# Patient Record
Sex: FEMALE | Race: WHITE | NOT HISPANIC OR LATINO
[De-identification: names, ages, dates, MRNs, and addresses within clinical notes are randomized per-mention and may not be internally consistent; named-entity substitution may affect disease eponyms.]

---

## 2017-02-16 ENCOUNTER — APPOINTMENT (OUTPATIENT)
Dept: ENDOCRINOLOGY | Facility: CLINIC | Age: 67
End: 2017-02-16

## 2017-02-16 VITALS
SYSTOLIC BLOOD PRESSURE: 126 MMHG | HEART RATE: 77 BPM | DIASTOLIC BLOOD PRESSURE: 84 MMHG | WEIGHT: 264 LBS | HEIGHT: 63.5 IN | BODY MASS INDEX: 46.2 KG/M2 | OXYGEN SATURATION: 97 %

## 2017-03-06 LAB
CORTIS SAL-MCNC: NORMAL
CORTIS SAL-MCNC: NORMAL

## 2017-04-11 ENCOUNTER — TRANSCRIPTION ENCOUNTER (OUTPATIENT)
Age: 67
End: 2017-04-11

## 2017-04-21 ENCOUNTER — APPOINTMENT (OUTPATIENT)
Dept: ENDOCRINOLOGY | Facility: CLINIC | Age: 67
End: 2017-04-21

## 2017-04-26 ENCOUNTER — RX RENEWAL (OUTPATIENT)
Age: 67
End: 2017-04-26

## 2017-05-05 ENCOUNTER — APPOINTMENT (OUTPATIENT)
Dept: ORTHOPEDIC SURGERY | Facility: CLINIC | Age: 67
End: 2017-05-05

## 2017-05-05 VITALS
HEART RATE: 86 BPM | HEIGHT: 63.5 IN | SYSTOLIC BLOOD PRESSURE: 156 MMHG | BODY MASS INDEX: 46.2 KG/M2 | DIASTOLIC BLOOD PRESSURE: 88 MMHG | WEIGHT: 264 LBS

## 2017-05-05 RX ORDER — EPINEPHRINE 0.3 MG/.3ML
0.3 INJECTION INTRAMUSCULAR
Qty: 2 | Refills: 0 | Status: ACTIVE | COMMUNITY
Start: 2015-09-03

## 2017-05-05 RX ORDER — HYDROXYZINE PAMOATE 50 MG/1
50 CAPSULE ORAL
Qty: 500 | Refills: 0 | Status: ACTIVE | COMMUNITY
Start: 2015-10-20

## 2017-05-08 ENCOUNTER — FORM ENCOUNTER (OUTPATIENT)
Age: 67
End: 2017-05-08

## 2017-05-09 ENCOUNTER — APPOINTMENT (OUTPATIENT)
Dept: MRI IMAGING | Facility: CLINIC | Age: 67
End: 2017-05-09

## 2017-05-09 ENCOUNTER — OUTPATIENT (OUTPATIENT)
Dept: OUTPATIENT SERVICES | Facility: HOSPITAL | Age: 67
LOS: 1 days | End: 2017-05-09
Payer: MEDICARE

## 2017-05-09 DIAGNOSIS — M17.11 UNILATERAL PRIMARY OSTEOARTHRITIS, RIGHT KNEE: ICD-10-CM

## 2017-05-09 PROCEDURE — 73721 MRI JNT OF LWR EXTRE W/O DYE: CPT

## 2017-05-31 ENCOUNTER — APPOINTMENT (OUTPATIENT)
Dept: ORTHOPEDIC SURGERY | Facility: CLINIC | Age: 67
End: 2017-05-31

## 2017-06-08 ENCOUNTER — APPOINTMENT (OUTPATIENT)
Dept: GASTROENTEROLOGY | Facility: CLINIC | Age: 67
End: 2017-06-08

## 2017-07-27 ENCOUNTER — APPOINTMENT (OUTPATIENT)
Dept: GASTROENTEROLOGY | Facility: CLINIC | Age: 67
End: 2017-07-27

## 2017-08-02 ENCOUNTER — APPOINTMENT (OUTPATIENT)
Dept: ORTHOPEDIC SURGERY | Facility: CLINIC | Age: 67
End: 2017-08-02
Payer: MEDICARE

## 2017-08-02 VITALS
HEART RATE: 97 BPM | BODY MASS INDEX: 46.2 KG/M2 | DIASTOLIC BLOOD PRESSURE: 85 MMHG | SYSTOLIC BLOOD PRESSURE: 138 MMHG | HEIGHT: 63.5 IN | WEIGHT: 264 LBS

## 2017-08-02 PROCEDURE — 99214 OFFICE O/P EST MOD 30 MIN: CPT | Mod: 25

## 2017-08-02 PROCEDURE — 20610 DRAIN/INJ JOINT/BURSA W/O US: CPT | Mod: RT

## 2017-09-08 ENCOUNTER — OUTPATIENT (OUTPATIENT)
Dept: OUTPATIENT SERVICES | Facility: HOSPITAL | Age: 67
LOS: 1 days | Discharge: HOME | End: 2017-09-08
Payer: MEDICARE

## 2017-09-08 ENCOUNTER — RESULT REVIEW (OUTPATIENT)
Age: 67
End: 2017-09-08

## 2017-09-08 ENCOUNTER — APPOINTMENT (OUTPATIENT)
Dept: GASTROENTEROLOGY | Facility: HOSPITAL | Age: 67
End: 2017-09-08

## 2017-09-08 DIAGNOSIS — K21.0 GASTRO-ESOPHAGEAL REFLUX DISEASE WITH ESOPHAGITIS: ICD-10-CM

## 2017-09-08 PROCEDURE — 88305 TISSUE EXAM BY PATHOLOGIST: CPT | Mod: 26

## 2017-09-08 PROCEDURE — 45380 COLONOSCOPY AND BIOPSY: CPT

## 2017-09-25 ENCOUNTER — APPOINTMENT (OUTPATIENT)
Dept: GASTROENTEROLOGY | Facility: CLINIC | Age: 67
End: 2017-09-25
Payer: MEDICARE

## 2017-09-25 PROCEDURE — 99214 OFFICE O/P EST MOD 30 MIN: CPT

## 2017-09-25 PROCEDURE — 99204 OFFICE O/P NEW MOD 45 MIN: CPT

## 2017-09-26 ENCOUNTER — APPOINTMENT (OUTPATIENT)
Dept: NEUROLOGY | Facility: CLINIC | Age: 67
End: 2017-09-26

## 2017-10-02 ENCOUNTER — OUTPATIENT (OUTPATIENT)
Dept: OUTPATIENT SERVICES | Facility: HOSPITAL | Age: 67
LOS: 1 days | End: 2017-10-02
Payer: MEDICARE

## 2017-10-02 DIAGNOSIS — R13.10 DYSPHAGIA, UNSPECIFIED: ICD-10-CM

## 2017-10-02 PROCEDURE — 74220 X-RAY XM ESOPHAGUS 1CNTRST: CPT | Mod: 26

## 2017-10-02 PROCEDURE — 74220 X-RAY XM ESOPHAGUS 1CNTRST: CPT

## 2017-10-10 ENCOUNTER — APPOINTMENT (OUTPATIENT)
Dept: ORTHOPEDIC SURGERY | Facility: CLINIC | Age: 67
End: 2017-10-10
Payer: MEDICARE

## 2017-10-10 VITALS
SYSTOLIC BLOOD PRESSURE: 161 MMHG | WEIGHT: 264 LBS | HEART RATE: 86 BPM | BODY MASS INDEX: 46.2 KG/M2 | DIASTOLIC BLOOD PRESSURE: 95 MMHG | HEIGHT: 63.5 IN

## 2017-10-10 DIAGNOSIS — Z96.651 PRESENCE OF RIGHT ARTIFICIAL KNEE JOINT: ICD-10-CM

## 2017-10-10 PROCEDURE — 20610 DRAIN/INJ JOINT/BURSA W/O US: CPT | Mod: RT

## 2017-10-10 PROCEDURE — 99214 OFFICE O/P EST MOD 30 MIN: CPT | Mod: 25

## 2017-10-10 PROCEDURE — 73562 X-RAY EXAM OF KNEE 3: CPT | Mod: 50

## 2017-10-12 ENCOUNTER — OTHER (OUTPATIENT)
Age: 67
End: 2017-10-12

## 2017-11-08 ENCOUNTER — APPOINTMENT (OUTPATIENT)
Dept: ORTHOPEDIC SURGERY | Facility: CLINIC | Age: 67
End: 2017-11-08
Payer: MEDICARE

## 2017-11-08 VITALS
BODY MASS INDEX: 46.2 KG/M2 | DIASTOLIC BLOOD PRESSURE: 95 MMHG | WEIGHT: 264 LBS | HEART RATE: 85 BPM | SYSTOLIC BLOOD PRESSURE: 145 MMHG | HEIGHT: 63.5 IN

## 2017-11-08 DIAGNOSIS — Z96.652 PRESENCE OF LEFT ARTIFICIAL KNEE JOINT: ICD-10-CM

## 2017-11-08 PROCEDURE — 99214 OFFICE O/P EST MOD 30 MIN: CPT

## 2017-11-13 ENCOUNTER — APPOINTMENT (OUTPATIENT)
Dept: DERMATOLOGY | Facility: CLINIC | Age: 67
End: 2017-11-13
Payer: MEDICARE

## 2017-11-13 PROCEDURE — 99214 OFFICE O/P EST MOD 30 MIN: CPT

## 2017-12-26 ENCOUNTER — APPOINTMENT (OUTPATIENT)
Dept: ORTHOPEDIC SURGERY | Facility: CLINIC | Age: 67
End: 2017-12-26
Payer: MEDICARE

## 2017-12-26 VITALS
WEIGHT: 264 LBS | BODY MASS INDEX: 46.2 KG/M2 | HEART RATE: 83 BPM | DIASTOLIC BLOOD PRESSURE: 109 MMHG | SYSTOLIC BLOOD PRESSURE: 164 MMHG | HEIGHT: 63.5 IN

## 2017-12-26 PROCEDURE — 20610 DRAIN/INJ JOINT/BURSA W/O US: CPT | Mod: RT

## 2018-01-16 ENCOUNTER — APPOINTMENT (OUTPATIENT)
Dept: ORTHOPEDIC SURGERY | Facility: HOSPITAL | Age: 68
End: 2018-01-16

## 2018-02-09 ENCOUNTER — APPOINTMENT (OUTPATIENT)
Dept: ORTHOPEDIC SURGERY | Facility: CLINIC | Age: 68
End: 2018-02-09

## 2018-03-06 ENCOUNTER — APPOINTMENT (OUTPATIENT)
Dept: ORTHOPEDIC SURGERY | Facility: CLINIC | Age: 68
End: 2018-03-06

## 2018-04-11 ENCOUNTER — APPOINTMENT (OUTPATIENT)
Dept: ORTHOPEDIC SURGERY | Facility: CLINIC | Age: 68
End: 2018-04-11

## 2018-04-30 ENCOUNTER — APPOINTMENT (OUTPATIENT)
Dept: ORTHOPEDIC SURGERY | Facility: CLINIC | Age: 68
End: 2018-04-30

## 2018-05-21 ENCOUNTER — APPOINTMENT (OUTPATIENT)
Dept: DERMATOLOGY | Facility: CLINIC | Age: 68
End: 2018-05-21
Payer: MEDICARE

## 2018-05-21 PROCEDURE — 99213 OFFICE O/P EST LOW 20 MIN: CPT

## 2018-06-01 ENCOUNTER — APPOINTMENT (OUTPATIENT)
Dept: ORTHOPEDIC SURGERY | Facility: CLINIC | Age: 68
End: 2018-06-01
Payer: MEDICARE

## 2018-06-01 VITALS
DIASTOLIC BLOOD PRESSURE: 87 MMHG | HEIGHT: 63.5 IN | HEART RATE: 80 BPM | BODY MASS INDEX: 46.2 KG/M2 | WEIGHT: 264 LBS | SYSTOLIC BLOOD PRESSURE: 134 MMHG

## 2018-06-01 PROCEDURE — 99214 OFFICE O/P EST MOD 30 MIN: CPT

## 2018-06-05 ENCOUNTER — OUTPATIENT (OUTPATIENT)
Dept: OUTPATIENT SERVICES | Facility: HOSPITAL | Age: 68
LOS: 1 days | End: 2018-06-05
Payer: MEDICARE

## 2018-06-05 VITALS
SYSTOLIC BLOOD PRESSURE: 147 MMHG | WEIGHT: 265 LBS | HEART RATE: 85 BPM | DIASTOLIC BLOOD PRESSURE: 98 MMHG | TEMPERATURE: 97 F | RESPIRATION RATE: 16 BRPM | HEIGHT: 63 IN

## 2018-06-05 DIAGNOSIS — M17.11 UNILATERAL PRIMARY OSTEOARTHRITIS, RIGHT KNEE: ICD-10-CM

## 2018-06-05 DIAGNOSIS — Z96.652 PRESENCE OF LEFT ARTIFICIAL KNEE JOINT: Chronic | ICD-10-CM

## 2018-06-05 DIAGNOSIS — O03.9 COMPLETE OR UNSPECIFIED SPONTANEOUS ABORTION WITHOUT COMPLICATION: Chronic | ICD-10-CM

## 2018-06-05 DIAGNOSIS — Z01.818 ENCOUNTER FOR OTHER PREPROCEDURAL EXAMINATION: ICD-10-CM

## 2018-06-05 DIAGNOSIS — I10 ESSENTIAL (PRIMARY) HYPERTENSION: ICD-10-CM

## 2018-06-05 DIAGNOSIS — M79.7 FIBROMYALGIA: ICD-10-CM

## 2018-06-05 DIAGNOSIS — Z29.9 ENCOUNTER FOR PROPHYLACTIC MEASURES, UNSPECIFIED: ICD-10-CM

## 2018-06-05 LAB
APTT BLD: 30 SEC — SIGNIFICANT CHANGE UP (ref 27.5–37.4)
BLD GP AB SCN SERPL QL: SIGNIFICANT CHANGE UP
INR BLD: 0.99 RATIO — SIGNIFICANT CHANGE UP (ref 0.88–1.16)
MRSA PCR RESULT.: DETECTED
PROTHROM AB SERPL-ACNC: 10.9 SEC — SIGNIFICANT CHANGE UP (ref 9.8–12.7)
S AUREUS DNA NOSE QL NAA+PROBE: DETECTED
TYPE + AB SCN PNL BLD: SIGNIFICANT CHANGE UP

## 2018-06-05 PROCEDURE — 86900 BLOOD TYPING SEROLOGIC ABO: CPT

## 2018-06-05 PROCEDURE — 86850 RBC ANTIBODY SCREEN: CPT

## 2018-06-05 PROCEDURE — 36415 COLL VENOUS BLD VENIPUNCTURE: CPT

## 2018-06-05 PROCEDURE — G0463: CPT

## 2018-06-05 PROCEDURE — 85610 PROTHROMBIN TIME: CPT

## 2018-06-05 PROCEDURE — 87640 STAPH A DNA AMP PROBE: CPT

## 2018-06-05 PROCEDURE — 93005 ELECTROCARDIOGRAM TRACING: CPT

## 2018-06-05 PROCEDURE — 87641 MR-STAPH DNA AMP PROBE: CPT

## 2018-06-05 PROCEDURE — 93010 ELECTROCARDIOGRAM REPORT: CPT

## 2018-06-05 PROCEDURE — 85730 THROMBOPLASTIN TIME PARTIAL: CPT

## 2018-06-05 PROCEDURE — 86901 BLOOD TYPING SEROLOGIC RH(D): CPT

## 2018-06-05 RX ORDER — SODIUM CHLORIDE 9 MG/ML
3 INJECTION INTRAMUSCULAR; INTRAVENOUS; SUBCUTANEOUS EVERY 8 HOURS
Qty: 0 | Refills: 0 | Status: DISCONTINUED | OUTPATIENT
Start: 2018-06-19 | End: 2018-06-19

## 2018-06-05 RX ORDER — MUPIROCIN 20 MG/G
1 OINTMENT TOPICAL
Qty: 1 | Refills: 0 | OUTPATIENT
Start: 2018-06-05 | End: 2018-06-09

## 2018-06-05 NOTE — H&P PST ADULT - LAB RESULTS AND INTERPRETATION
6/5/2018  Pt and Ortho PA made aware of postive MRSA/MSSA.  Rx e-prescribed and pt made aware. (FARHAN SANTA)

## 2018-06-05 NOTE — PATIENT PROFILE ADULT. - LEARNING ASSESSMENT (PATIENT) ADDITIONAL COMMENTS
Instructed pt on pre-op instructions, tips for safer surgery, pain management scale, pre-surgical infection prevention instructions, MRSA/MSSA instructions, Medical Clearance with Dr. Perry, Cardiac clearance with Dr Saba, Joint Replacement Book(Knee) given to pt as per Elissa Lundberg, take Levothyroxine with a sip of water the morning of surgery and verbalized understanding of all.

## 2018-06-05 NOTE — PATIENT PROFILE ADULT. - PRO MENTAL HEALTH SX RECENT
feeling down/feeling hopelessness/little interest or pleasure in doing things/difficulty concentrating/feeling depressed

## 2018-06-05 NOTE — H&P PST ADULT - ASSESSMENT
CAPRINI SCORE [CLOT]    AGE RELATED RISK FACTORS                                                       MOBILITY RELATED FACTORS  [ ] Age 41-60 years                                            (1 Point)                  [ ] Bed rest                                                        (1 Point)  [x] Age: 61-74 years                                           (2 Points)                 [ ] Plaster cast                                                   (2 Points)  [ ] Age= 75 years                                              (3 Points)                 [ ] Bed bound for more than 72 hours                 (2 Points)    DISEASE RELATED RISK FACTORS                                               GENDER SPECIFIC FACTORS  [ ] Edema in the lower extremities                       (1 Point)                  [ ] Pregnancy                                                     (1 Point)  [ ] Varicose veins                                               (1 Point)                  [ ] Post-partum < 6 weeks                                   (1 Point)             [x ] BMI > 25 Kg/m2                                            (1 Point)                  [x ] Hormonal therapy  or oral contraception          (1 Point)                 [ ] Sepsis (in the previous month)                        (1 Point)                  [ ] History of pregnancy complications                 (1 point)  [ ] Pneumonia or serious lung disease                                               [ ] Unexplained or recurrent                     (1 Point)           (in the previous month)                               (1 Point)  [ ] Abnormal pulmonary function test                     (1 Point)                 SURGERY RELATED RISK FACTORS  [ ] Acute myocardial infarction                              (1 Point)                 [ ]  Section                                             (1 Point)  [ ] Congestive heart failure (in the previous month)  (1 Point)               [ ] Minor surgery                                                  (1 Point)   [ ] Inflammatory bowel disease                             (1 Point)                 [ ] Arthroscopic surgery                                        (2 Points)  [ ] Central venous access                                      (2 Points)                [ ] General surgery lasting more than 45 minutes   (2 Points)       [ ] Stroke (in the previous month)                          (5 Points)               [x ] Elective arthroplasty                                         (5 Points)                                                                                                                                               HEMATOLOGY RELATED FACTORS                                                 TRAUMA RELATED RISK FACTORS  [ ] Prior episodes of VTE                                     (3 Points)                 [ ] Fracture of the hip, pelvis, or leg                       (5 Points)  [ ] Positive family history for VTE                         (3 Points)                 [ ] Acute spinal cord injury (in the previous month)  (5 Points)  [ ] Prothrombin 38758 A                                     (3 Points)                 [ ] Paralysis  (less than 1 month)                             (5 Points)  [ ] Factor V Leiden                                             (3 Points)                  [ ] Multiple Trauma within 1 month                        (5 Points)  [ ] Lupus anticoagulants                                     (3 Points)                                                           [ ] Anticardiolipin antibodies                               (3 Points)                                                       [ ] High homocysteine in the blood                      (3 Points)                                             [ ] Other congenital or acquired thrombophilia      (3 Points)                                                [ ] Heparin induced thrombocytopenia                  (3 Points)                                          Total Score [   9       ]

## 2018-06-05 NOTE — PATIENT PROFILE ADULT. - FAMILY HISTORY
Mother  Still living? No  Family history of throat cancer, Age at diagnosis: Age Unknown     Father  Still living? No  Family history of acute myocardial infarction, Age at diagnosis: 41-50  Family history of hypertension, Age at diagnosis: Age Unknown  Family history of diabetes mellitus, Age at diagnosis: Age Unknown     Sibling  Still living? Unknown  Family history of coronary thrombosis, Age at diagnosis: 71-80

## 2018-06-05 NOTE — PATIENT PROFILE ADULT. - PSH
H/O ovarian cystectomy    H/O: hysterectomy  LOUISE, BSO    History of total left knee replacement    Obesity    S/P appendectomy    S/P cholecystectomy    S/P colon resection  12  S/P D&C    S/P lumpectomy of breast  right breast -benign  12  Trauma  s/p multiple repair of uterine trauma secondary to an accident as a child with a broom

## 2018-06-05 NOTE — H&P PST ADULT - RS GEN PE MLT RESP DETAILS PC
diminished breath sounds, L/respirations non-labored/diminished breath sounds, R/airway patent/normal

## 2018-06-05 NOTE — PATIENT PROFILE ADULT. - PMH
Alcoholism    Anxiety    Asthma  well controlled  Chronic urticaria    Diverticulitis    Esophagus disorder  Dysperistalsis of lower half of esophagus.  Fibromyalgia    GERD (gastroesophageal reflux disease)    H/O allergy  environmental, animal  HTN (hypertension)    Hypothyroidism  h/o Hashimoto Thyroiditis  IBS (irritable bowel syndrome)    Lactose intolerance    Lupus  discoid type  Meniscus tear  left knee  MVP (mitral valve prolapse)    Osteoarthritis    Prediabetes    Risk factors for obstructive sleep apnea    Sjoegren syndrome    UTI (urinary tract infection)  frequent, last treated on 12/10/13 X 10 days with Bactrim, last cultured on 12/31 which was negative

## 2018-06-05 NOTE — H&P PST ADULT - HISTORY OF PRESENT ILLNESS
This is a 67 y.o female who presents to [facility] today.  The pt reports a two year history of right knee pain for which she has undergone injections for in the past.  She is now scheduled for a right knee replacement in the near future.

## 2018-06-05 NOTE — H&P PST ADULT - NSANTHOSAYNRD_GEN_A_CORE
No. ZAKIYA screening performed.  STOP BANG Legend: 0-2 = LOW Risk; 3-4 = INTERMEDIATE Risk; 5-8 = HIGH Risk

## 2018-06-05 NOTE — H&P PST ADULT - PMH
Anxiety    Asthma  well controlled  Chronic urticaria    Diverticulitis    Fibromyalgia    GERD (gastroesophageal reflux disease)    H/O allergy  environmental, animal  HTN (hypertension)    Hypothyroidism  h/o Hashimoto Thyroiditis  IBS (irritable bowel syndrome)    Lactose intolerance    Lupus  discoid type  Meniscus tear  left knee  MVP (mitral valve prolapse)    Osteoarthritis    Prediabetes    Sjoegren syndrome    UTI (urinary tract infection)  frequent, last treated on 12/10/13 X 10 days with Bactrim, last cultured on 12/31 which was negative Alcoholism    Anxiety    Asthma  well controlled  Chronic urticaria    Diverticulitis    Esophagus disorder  Dysperistalsis of lower half of esophagus.  Fibromyalgia    GERD (gastroesophageal reflux disease)    H/O allergy  environmental, animal  HTN (hypertension)    Hypothyroidism  h/o Hashimoto Thyroiditis  IBS (irritable bowel syndrome)    Lactose intolerance    Lupus  discoid type  Meniscus tear  left knee  MVP (mitral valve prolapse)    Osteoarthritis    Prediabetes    Risk factors for obstructive sleep apnea    Sjoegren syndrome    UTI (urinary tract infection)  frequent, last treated on 12/10/13 X 10 days with Bactrim, last cultured on 12/31 which was negative

## 2018-06-18 RX ORDER — TRANEXAMIC ACID 100 MG/ML
1200 INJECTION, SOLUTION INTRAVENOUS ONCE
Qty: 0 | Refills: 0 | Status: DISCONTINUED | OUTPATIENT
Start: 2018-06-19 | End: 2018-06-19

## 2018-06-18 RX ORDER — OXYCODONE HYDROCHLORIDE 5 MG/1
10 TABLET ORAL ONCE
Qty: 0 | Refills: 0 | Status: DISCONTINUED | OUTPATIENT
Start: 2018-06-19 | End: 2018-06-19

## 2018-06-18 RX ORDER — GABAPENTIN 400 MG/1
600 CAPSULE ORAL ONCE
Qty: 0 | Refills: 0 | Status: COMPLETED | OUTPATIENT
Start: 2018-06-19 | End: 2018-06-19

## 2018-06-18 RX ORDER — CELECOXIB 200 MG/1
400 CAPSULE ORAL ONCE
Qty: 0 | Refills: 0 | Status: COMPLETED | OUTPATIENT
Start: 2018-06-19 | End: 2018-06-19

## 2018-06-18 RX ORDER — ACETAMINOPHEN 500 MG
1000 TABLET ORAL ONCE
Qty: 0 | Refills: 0 | Status: DISCONTINUED | OUTPATIENT
Start: 2018-06-19 | End: 2018-06-19

## 2018-06-18 RX ORDER — VANCOMYCIN HCL 1 G
1500 VIAL (EA) INTRAVENOUS ONCE
Qty: 0 | Refills: 0 | Status: COMPLETED | OUTPATIENT
Start: 2018-06-19 | End: 2018-06-19

## 2018-06-19 ENCOUNTER — RESULT REVIEW (OUTPATIENT)
Age: 68
End: 2018-06-19

## 2018-06-19 ENCOUNTER — APPOINTMENT (OUTPATIENT)
Dept: ORTHOPEDIC SURGERY | Facility: HOSPITAL | Age: 68
End: 2018-06-19

## 2018-06-19 ENCOUNTER — TRANSCRIPTION ENCOUNTER (OUTPATIENT)
Age: 68
End: 2018-06-19

## 2018-06-19 ENCOUNTER — INPATIENT (INPATIENT)
Facility: HOSPITAL | Age: 68
LOS: 2 days | Discharge: HOME | DRG: 470 | End: 2018-06-22
Attending: ORTHOPAEDIC SURGERY | Admitting: ORTHOPAEDIC SURGERY
Payer: MEDICARE

## 2018-06-19 VITALS
WEIGHT: 264.55 LBS | RESPIRATION RATE: 16 BRPM | TEMPERATURE: 97 F | HEART RATE: 78 BPM | OXYGEN SATURATION: 96 % | SYSTOLIC BLOOD PRESSURE: 111 MMHG | HEIGHT: 63 IN | DIASTOLIC BLOOD PRESSURE: 63 MMHG

## 2018-06-19 DIAGNOSIS — J45.909 UNSPECIFIED ASTHMA, UNCOMPLICATED: ICD-10-CM

## 2018-06-19 DIAGNOSIS — E03.9 HYPOTHYROIDISM, UNSPECIFIED: ICD-10-CM

## 2018-06-19 DIAGNOSIS — I10 ESSENTIAL (PRIMARY) HYPERTENSION: ICD-10-CM

## 2018-06-19 DIAGNOSIS — M17.11 UNILATERAL PRIMARY OSTEOARTHRITIS, RIGHT KNEE: ICD-10-CM

## 2018-06-19 DIAGNOSIS — Z96.652 PRESENCE OF LEFT ARTIFICIAL KNEE JOINT: Chronic | ICD-10-CM

## 2018-06-19 DIAGNOSIS — Z29.9 ENCOUNTER FOR PROPHYLACTIC MEASURES, UNSPECIFIED: ICD-10-CM

## 2018-06-19 DIAGNOSIS — R73.03 PREDIABETES: ICD-10-CM

## 2018-06-19 DIAGNOSIS — O03.9 COMPLETE OR UNSPECIFIED SPONTANEOUS ABORTION WITHOUT COMPLICATION: Chronic | ICD-10-CM

## 2018-06-19 DIAGNOSIS — F10.20 ALCOHOL DEPENDENCE, UNCOMPLICATED: ICD-10-CM

## 2018-06-19 DIAGNOSIS — K21.9 GASTRO-ESOPHAGEAL REFLUX DISEASE WITHOUT ESOPHAGITIS: ICD-10-CM

## 2018-06-19 DIAGNOSIS — F41.9 ANXIETY DISORDER, UNSPECIFIED: ICD-10-CM

## 2018-06-19 LAB
BLD GP AB SCN SERPL QL: SIGNIFICANT CHANGE UP
GLUCOSE BLDC GLUCOMTR-MCNC: 103 MG/DL — HIGH (ref 70–99)
GLUCOSE BLDC GLUCOMTR-MCNC: 112 MG/DL — HIGH (ref 70–99)
GLUCOSE BLDC GLUCOMTR-MCNC: 115 MG/DL — HIGH (ref 70–99)
TYPE + AB SCN PNL BLD: SIGNIFICANT CHANGE UP

## 2018-06-19 PROCEDURE — 88311 DECALCIFY TISSUE: CPT | Mod: 26

## 2018-06-19 PROCEDURE — 20985 CPTR-ASST DIR MS PX: CPT | Mod: AS,59

## 2018-06-19 PROCEDURE — 27447 TOTAL KNEE ARTHROPLASTY: CPT | Mod: RT

## 2018-06-19 PROCEDURE — 27447 TOTAL KNEE ARTHROPLASTY: CPT | Mod: AS,RT

## 2018-06-19 PROCEDURE — 73560 X-RAY EXAM OF KNEE 1 OR 2: CPT | Mod: 26,RT

## 2018-06-19 PROCEDURE — 88305 TISSUE EXAM BY PATHOLOGIST: CPT | Mod: 26

## 2018-06-19 PROCEDURE — 99222 1ST HOSP IP/OBS MODERATE 55: CPT

## 2018-06-19 PROCEDURE — 20985 CPTR-ASST DIR MS PX: CPT | Mod: 59

## 2018-06-19 RX ORDER — SODIUM CHLORIDE 9 MG/ML
1000 INJECTION, SOLUTION INTRAVENOUS
Qty: 0 | Refills: 0 | Status: DISCONTINUED | OUTPATIENT
Start: 2018-06-19 | End: 2018-06-22

## 2018-06-19 RX ORDER — ONDANSETRON 8 MG/1
4 TABLET, FILM COATED ORAL EVERY 6 HOURS
Qty: 0 | Refills: 0 | Status: DISCONTINUED | OUTPATIENT
Start: 2018-06-20 | End: 2018-06-22

## 2018-06-19 RX ORDER — FOLIC ACID 0.8 MG
1 TABLET ORAL DAILY
Qty: 0 | Refills: 0 | Status: DISCONTINUED | OUTPATIENT
Start: 2018-06-20 | End: 2018-06-22

## 2018-06-19 RX ORDER — ALBUTEROL 90 UG/1
2 AEROSOL, METERED ORAL EVERY 6 HOURS
Qty: 0 | Refills: 0 | Status: DISCONTINUED | OUTPATIENT
Start: 2018-06-20 | End: 2018-06-22

## 2018-06-19 RX ORDER — MAGNESIUM HYDROXIDE 400 MG/1
30 TABLET, CHEWABLE ORAL DAILY
Qty: 0 | Refills: 0 | Status: DISCONTINUED | OUTPATIENT
Start: 2018-06-20 | End: 2018-06-22

## 2018-06-19 RX ORDER — LISINOPRIL 2.5 MG/1
40 TABLET ORAL DAILY
Qty: 0 | Refills: 0 | Status: DISCONTINUED | OUTPATIENT
Start: 2018-06-21 | End: 2018-06-22

## 2018-06-19 RX ORDER — VANCOMYCIN HCL 1 G
1500 VIAL (EA) INTRAVENOUS
Qty: 0 | Refills: 0 | Status: COMPLETED | OUTPATIENT
Start: 2018-06-19 | End: 2018-06-19

## 2018-06-19 RX ORDER — HYDROMORPHONE HYDROCHLORIDE 2 MG/ML
0.5 INJECTION INTRAMUSCULAR; INTRAVENOUS; SUBCUTANEOUS EVERY 4 HOURS
Qty: 0 | Refills: 0 | Status: DISCONTINUED | OUTPATIENT
Start: 2018-06-20 | End: 2018-06-20

## 2018-06-19 RX ORDER — DOCUSATE SODIUM 100 MG
100 CAPSULE ORAL THREE TIMES A DAY
Qty: 0 | Refills: 0 | Status: DISCONTINUED | OUTPATIENT
Start: 2018-06-20 | End: 2018-06-22

## 2018-06-19 RX ORDER — LEVOTHYROXINE SODIUM 125 MCG
150 TABLET ORAL DAILY
Qty: 0 | Refills: 0 | Status: DISCONTINUED | OUTPATIENT
Start: 2018-06-19 | End: 2018-06-22

## 2018-06-19 RX ORDER — OXYCODONE HYDROCHLORIDE 5 MG/1
5 TABLET ORAL EVERY 4 HOURS
Qty: 0 | Refills: 0 | Status: DISCONTINUED | OUTPATIENT
Start: 2018-06-19 | End: 2018-06-19

## 2018-06-19 RX ORDER — MORPHINE SULFATE 50 MG/1
4 CAPSULE, EXTENDED RELEASE ORAL
Qty: 0 | Refills: 0 | Status: DISCONTINUED | OUTPATIENT
Start: 2018-06-19 | End: 2018-06-19

## 2018-06-19 RX ORDER — LEVOTHYROXINE SODIUM 125 MCG
137 TABLET ORAL DAILY
Qty: 0 | Refills: 0 | Status: DISCONTINUED | OUTPATIENT
Start: 2018-06-19 | End: 2018-06-22

## 2018-06-19 RX ORDER — OXYCODONE HYDROCHLORIDE 5 MG/1
10 TABLET ORAL
Qty: 0 | Refills: 0 | Status: DISCONTINUED | OUTPATIENT
Start: 2018-06-19 | End: 2018-06-20

## 2018-06-19 RX ORDER — OXYCODONE HYDROCHLORIDE 5 MG/1
10 TABLET ORAL EVERY 4 HOURS
Qty: 0 | Refills: 0 | Status: DISCONTINUED | OUTPATIENT
Start: 2018-06-20 | End: 2018-06-20

## 2018-06-19 RX ORDER — THIAMINE MONONITRATE (VIT B1) 100 MG
100 TABLET ORAL DAILY
Qty: 0 | Refills: 0 | Status: COMPLETED | OUTPATIENT
Start: 2018-06-19 | End: 2018-06-22

## 2018-06-19 RX ORDER — SODIUM CHLORIDE 9 MG/ML
1000 INJECTION, SOLUTION INTRAVENOUS
Qty: 0 | Refills: 0 | Status: DISCONTINUED | OUTPATIENT
Start: 2018-06-19 | End: 2018-06-19

## 2018-06-19 RX ORDER — ACETAMINOPHEN 500 MG
975 TABLET ORAL EVERY 8 HOURS
Qty: 0 | Refills: 0 | Status: DISCONTINUED | OUTPATIENT
Start: 2018-06-19 | End: 2018-06-22

## 2018-06-19 RX ORDER — AMLODIPINE BESYLATE 2.5 MG/1
5 TABLET ORAL DAILY
Qty: 0 | Refills: 0 | Status: DISCONTINUED | OUTPATIENT
Start: 2018-06-19 | End: 2018-06-22

## 2018-06-19 RX ORDER — OXYCODONE HYDROCHLORIDE 5 MG/1
5 TABLET ORAL
Qty: 0 | Refills: 0 | Status: DISCONTINUED | OUTPATIENT
Start: 2018-06-19 | End: 2018-06-20

## 2018-06-19 RX ORDER — ENOXAPARIN SODIUM 100 MG/ML
30 INJECTION SUBCUTANEOUS EVERY 12 HOURS
Qty: 0 | Refills: 0 | Status: DISCONTINUED | OUTPATIENT
Start: 2018-06-20 | End: 2018-06-22

## 2018-06-19 RX ORDER — ONDANSETRON 8 MG/1
4 TABLET, FILM COATED ORAL ONCE
Qty: 0 | Refills: 0 | Status: DISCONTINUED | OUTPATIENT
Start: 2018-06-19 | End: 2018-06-19

## 2018-06-19 RX ORDER — KETOROLAC TROMETHAMINE 30 MG/ML
15 SYRINGE (ML) INJECTION ONCE
Qty: 0 | Refills: 0 | Status: DISCONTINUED | OUTPATIENT
Start: 2018-06-20 | End: 2018-06-20

## 2018-06-19 RX ORDER — FOLIC ACID 0.8 MG
1 TABLET ORAL DAILY
Qty: 0 | Refills: 0 | Status: DISCONTINUED | OUTPATIENT
Start: 2018-06-19 | End: 2018-06-21

## 2018-06-19 RX ORDER — POTASSIUM CHLORIDE 20 MEQ
10 PACKET (EA) ORAL DAILY
Qty: 0 | Refills: 0 | Status: DISCONTINUED | OUTPATIENT
Start: 2018-06-20 | End: 2018-06-22

## 2018-06-19 RX ORDER — CLONAZEPAM 1 MG
1 TABLET ORAL THREE TIMES A DAY
Qty: 0 | Refills: 0 | Status: DISCONTINUED | OUTPATIENT
Start: 2018-06-19 | End: 2018-06-21

## 2018-06-19 RX ORDER — PANTOPRAZOLE SODIUM 20 MG/1
40 TABLET, DELAYED RELEASE ORAL
Qty: 0 | Refills: 0 | Status: DISCONTINUED | OUTPATIENT
Start: 2018-06-20 | End: 2018-06-22

## 2018-06-19 RX ORDER — MORPHINE SULFATE 50 MG/1
2 CAPSULE, EXTENDED RELEASE ORAL EVERY 4 HOURS
Qty: 0 | Refills: 0 | Status: DISCONTINUED | OUTPATIENT
Start: 2018-06-19 | End: 2018-06-20

## 2018-06-19 RX ORDER — ACETAMINOPHEN 500 MG
650 TABLET ORAL EVERY 6 HOURS
Qty: 0 | Refills: 0 | Status: DISCONTINUED | OUTPATIENT
Start: 2018-06-20 | End: 2018-06-21

## 2018-06-19 RX ORDER — HYDROXYZINE HCL 10 MG
50 TABLET ORAL THREE TIMES A DAY
Qty: 0 | Refills: 0 | Status: DISCONTINUED | OUTPATIENT
Start: 2018-06-20 | End: 2018-06-22

## 2018-06-19 RX ADMIN — MORPHINE SULFATE 4 MILLIGRAM(S): 50 CAPSULE, EXTENDED RELEASE ORAL at 17:00

## 2018-06-19 RX ADMIN — Medication 1 MILLIGRAM(S): at 23:34

## 2018-06-19 RX ADMIN — Medication 100 MILLIGRAM(S): at 16:29

## 2018-06-19 RX ADMIN — Medication 300 MILLIGRAM(S): at 19:26

## 2018-06-19 RX ADMIN — CELECOXIB 400 MILLIGRAM(S): 200 CAPSULE ORAL at 07:10

## 2018-06-19 RX ADMIN — GABAPENTIN 600 MILLIGRAM(S): 400 CAPSULE ORAL at 07:11

## 2018-06-19 RX ADMIN — OXYCODONE HYDROCHLORIDE 10 MILLIGRAM(S): 5 TABLET ORAL at 07:11

## 2018-06-19 RX ADMIN — MORPHINE SULFATE 4 MILLIGRAM(S): 50 CAPSULE, EXTENDED RELEASE ORAL at 16:59

## 2018-06-19 RX ADMIN — OXYCODONE HYDROCHLORIDE 5 MILLIGRAM(S): 5 TABLET ORAL at 16:13

## 2018-06-19 RX ADMIN — Medication 300 MILLIGRAM(S): at 07:15

## 2018-06-19 RX ADMIN — Medication 100 MILLIGRAM(S): at 23:33

## 2018-06-19 NOTE — CONSULT NOTE ADULT - PROBLEM SELECTOR RECOMMENDATION 7
Patient state she was counseled to quit  , last drink was 2 wks ago , was treated with Librium for Alcohol withdrawal prophylaxis - will closely observe . Patient state she was counseled to quit  , last drink was 2 wks ago , was treated with Librium for Alcohol withdrawal prophylaxis - will closely observe . Will have patient on [facility] protocol .

## 2018-06-19 NOTE — CONSULT NOTE ADULT - SUBJECTIVE AND OBJECTIVE BOX
PMD : Mando  Cardio : Juan    Patient is a 67y old  Female who is s/p R TKA , POD # 0 , seen on PACU    CC: R knee pain   HPI:  This is a 67 y.o female with  two year history of right knee pain for which she has undergone injections for in the past( cortisone and gel ) , was taking Aleve with some relief . Pain got worst lately .  She is now s/p R TKA , POD # 0      PAST MEDICAL & SURGICAL HISTORY:  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  Risk factors for obstructive sleep apnea  Alcoholism - last drink 2 wks ago  Meniscus tear: left knee  Osteoarthritis  MVP (mitral valve prolapse)  Lactose intolerance  Fibromyalgia  Sjoegren syndrome  Lupus: discoid type  Chronic urticaria  Diverticulitis  Anxiety  H/O allergy: environmental, animal, food  GERD (gastroesophageal reflux disease)  IBS (irritable bowel syndrome)  Hypothyroidism: h/o Hashimoto Thyroiditis  HTN (hypertension)  Prediabetes  Asthma: well controlled    History of total left knee replacement  S/P lumpectomy of breast: right breast -benign  12  S/P colon resection: 13  Trauma: s/p multiple repair of uterine trauma secondary to an accident as a child with a broom  S/P D&C  Obesity  S/P cholecystectomy:   H/O: hysterectomy: LOUISEKARL RAMOS    S/P appendectomy:   H/O ovarian cystectomy      Social History:  Tobacco - denies  ETOH - use to drink liquor 5-6 drinks a day , state quit drinking 2 wks ago    Illicit drug abuse - denies    Retired     FAMILY HISTORY:  Family history of diabetes mellitus (Father)  Family history of hypertension (Father)  Family history of coronary thrombosis (Sibling)  Family history of acute myocardial infarction (Father)  Family history of throat cancer (Mother)      Allergies    banana, coconut, pineapple, mustard, vinegar, sulfite (Other)  Benadryl (Hives)  beta blockers (Anaphylaxis)  Ceclor (Hives)  Cipro (Hives)  dairy products (Unknown)  fentanyl (Hives)  Flagyl (Hives)  hydrocodone (Other)  hydromorphone (Other)  Keflex (Hives)  lactose (Unknown)  penicillin (Anaphylaxis)  Tree Nuts (Unknown)    Intolerances        HOME MEDICATIONS :     · 	Multiple Vitamins oral tablet: Last Dose Taken:  , 1 tab(s) orally once a day  · 	folic acid 1 mg oral tablet: Last Dose Taken:  , 1 tab(s) orally once a day  · 	Zestoretic 12.5 mg-20 mg oral tablet: Last Dose Taken:  , 1 tab(s) orally once a day  · 	conjugated estrogens 0.3 mg oral tablet: Last Dose Taken:  , 1 tab(s) orally once a day  · 	ocular lubricant ophthalmic solution: Last Dose Taken:  ,  to each affected eye   · 	clonazePAM 1 mg oral tablet: Last Dose Taken:  , 1 tab(s) orally 3 times a day  · 	melatonin 3 mg oral tablet: Last Dose Taken:  , 1 tab(s) orally once a day (at bedtime), As needed, Insomnia  · 	hydrOXYzine hydrochloride 50 mg oral tablet: Last Dose Taken:  , 1 tab(s) orally 3 times a day  · 	levothyroxine 137 mcg (0.137 mg) oral tablet: Last Dose Taken:  , orally every other day  · 	levothyroxine 150 mcg (0.15 mg) oral tablet: Last Dose Taken:  , orally every other day  · 	lisinopril 40 mg oral tablet: Last Dose Taken:  , orally 2 times a day  · 	potassium chloride 10 mEq oral tablet, extended release: Last Dose Taken:    · 	Norvasc 5 mg oral tablet: Last Dose Taken:  , 1 tab(s) orally 2 times a day  · 	pantoprazole 40 mg oral delayed release tablet: Last Dose Taken:  , 1 tab(s) orally once a day  · 	vitamin d: 2000 milligram(s)  once a day  · 	Xopenex HFA 45 mcg/inh inhalation aerosol: 2 puff(s) inhaled every 4 hours, As Needed  · 	albuterol: Last Dose Taken:    · 	EpiPen Auto-Injector 0.3 mg injectable kit: Last Dose Taken:    · 	Librium 25 mg oral capsule: 3 tab(s) orally once a day - finished 1 wk ago  (Due to alcohol abuse)    REVIEW OF SYSTEMS:    CONSTITUTIONAL: No fever, weight loss, or fatigue  EYES: No eye pain, visual disturbances, or discharge  NECK: No pain or stiffness  RESPIRATORY: No cough, wheezing, chills or hemoptysis; No shortness of breath  CARDIOVASCULAR: No chest pain, palpitations, dizziness, or leg swelling  GASTROINTESTINAL: No abdominal or epigastric pain. No nausea, vomiting, or hematemesis; No diarrhea or constipation. No melena or hematochezia.  GENITOURINARY: No dysuria, frequency, hematuria, or incontinence  NEUROLOGICAL: No headaches, memory loss, loss of strength, numbness, or tremors  SKIN: No itching, burning, rashes, or lesions   LYMPH NODES: No enlarged glands  ENDOCRINE: No heat or cold intolerance; No hair loss  MUSCULOSKELETAL: R knee pain   PSYCHIATRIC: No depression, anxiety, mood swings, or difficulty sleeping  HEME/LYMPH: No easy bruising, or bleeding gums  ALLERGY AND IMMUNOLOGIC: No hives or eczema    MEDICATIONS  (STANDING):  lactated ringers. 1000 milliLiter(s) (100 mL/Hr) IV Continuous <Continuous>    MEDICATIONS  (PRN):  morphine  - Injectable 4 milliGRAM(s) IV Push every 10 minutes PRN Moderate Pain (4 - 6)  ondansetron Injectable 4 milliGRAM(s) IV Push once PRN Nausea and/or Vomiting      Vital Signs Last 24 Hrs  T(C): 37 (2018 11:31), Max: 37 (2018 11:31)  T(F): 98.6 (2018 11:31), Max: 98.6 (2018 11:31)  HR: 88 (2018 11:40) (78 - 89)  BP: 90/48 (2018 11:40) (84/53 - 111/63)  BP(mean): --  RR: 10 (2018 11:40) (9 - 16)  SpO2: 96% (2018 11:40) (96% - 96%)    PHYSICAL EXAM:    GENERAL: NAD, well-groomed, well-developed  HEAD:  Atraumatic, Normocephalic  EYES: EOMI, PERRLA, conjunctiva and sclera clear  NECK: Supple, No JVD, Normal thyroid  NERVOUS SYSTEM:  Alert & Oriented X3, Good concentration; Motor Strength 5/5 B/L upper and lower extremities; DTRs 2+ intact and symmetric  CHEST/LUNG: CTA  b/l,  no rales, rhonchi, wheezing, or rubs  HEART: Regular rate and rhythm; No murmurs, rubs, or gallops  ABDOMEN: Soft, Nontender, Nondistended; Bowel sounds present  EXTREMITIES:  2+ Peripheral Pulses, No clubbing, cyanosis, or edema , R knee ACE WRAP + , clean and dry   SKIN: No rashes or lesions    LABS: Pending       RADIOLOGY & ADDITIONAL STUDIES:

## 2018-06-19 NOTE — PROGRESS NOTE ADULT - SUBJECTIVE AND OBJECTIVE BOX
Ortho Post Op Check    Name: BEAU LOVE    MR #: [id number]    Procedure: right total knee arthroplasty   Surgeon: Nett    Pt comfortable without complaints, pain controlled  Denies CP, SOB, N/V, numbness/tingling     General Exam:  Vital Signs Last 24 Hrs  T(C): 36 (06-19-18 @ 18:54), Max: 36 (06-19-18 @ 18:54)  T(F): 96.8 (06-19-18 @ 18:54), Max: 96.8 (06-19-18 @ 18:54)  HR: 63 (06-19-18 @ 18:54) (54 - 63)  BP: 102/53 (06-19-18 @ 18:54) (85/55 - 107/41)  BP(mean): --  RR: 16 (06-19-18 @ 18:54) (12 - 16)  SpO2: 100% (06-19-18 @ 18:54) (100% - 100%)    General: Pt Alert and oriented, NAD, controlled pain.  Dressings C/D/I. No bleeding.  Pulses: 2+ dorsalis pedis pulse. Cap refill < 2 sec.  Sensation: Grossly intact to light touch without deficit.  Motor: + EHL/FHL/TA/GS    Post-op X-Ray:    < from: Xray Knee 1 or 2 Views, Right (06.19.18 @ 11:45) >     EXAM:  KNEE-RIGHT                          PROCEDURE DATE:  06/19/2018          INTERPRETATION:  HISTORY: Postoperative  knee replacement.    Two views of the right knee are submitted.    Evaluation demonstrates the presence of a tricompartmental knee   replacement with the femoral, tibial and patellar components in proper   anatomic alignment. There is no fracture .       Impression:  Knee prosthetic components in proper anatomical alignment.        < end of copied text >      A/P: 67yFemale POD#0 s/p right total knee arthroplasty   - Stable  - Pain Control  - DVT ppx: Lovenox  - Post op abx: Ancef, Vanco  - PT eval pending  - Weight bearing status: WBAT  - Straight cath 480 ml in recovery at 1850 - monitor void status Ortho Post Op Check    Name: BEAU LOVE    MR #: [id number]    Procedure: right total knee arthroplasty   Surgeon: Nett    Pt comfortable without complaints, pain controlled  Denies CP, SOB, N/V, numbness/tingling     General Exam:  Vital Signs Last 24 Hrs  T(C): 36 (06-19-18 @ 18:54), Max: 36 (06-19-18 @ 18:54)  T(F): 96.8 (06-19-18 @ 18:54), Max: 96.8 (06-19-18 @ 18:54)  HR: 63 (06-19-18 @ 18:54) (54 - 63)  BP: 102/53 (06-19-18 @ 18:54) (85/55 - 107/41)  BP(mean): --  RR: 16 (06-19-18 @ 18:54) (12 - 16)  SpO2: 100% (06-19-18 @ 18:54) (100% - 100%)    General: Pt Alert and oriented, NAD, controlled pain.  Dressings C/D/I. No bleeding.  Pulses: 2+ dorsalis pedis pulse. Cap refill < 2 sec.  Sensation: Grossly intact to light touch without deficit.  Motor: + EHL/FHL/TA/GS    Post-op X-Ray:    < from: Xray Knee 1 or 2 Views, Right (06.19.18 @ 11:45) >     EXAM:  KNEE-RIGHT                          PROCEDURE DATE:  06/19/2018          INTERPRETATION:  HISTORY: Postoperative  knee replacement.    Two views of the right knee are submitted.    Evaluation demonstrates the presence of a tricompartmental knee   replacement with the femoral, tibial and patellar components in proper   anatomic alignment. There is no fracture .       Impression:  Knee prosthetic components in proper anatomical alignment.        < end of copied text >      A/P: 67yFemale POD#0 s/p right total knee arthroplasty   - Stable  - Pain Control  - DVT ppx: Lovenox  - Post op abx: Clinda, Vanco  - PT eval pending  - Weight bearing status: WBAT  - Straight cath 480 ml in recovery at 1850 - monitor void status

## 2018-06-19 NOTE — BRIEF OPERATIVE NOTE - PROCEDURE
<<-----Click on this checkbox to enter Procedure TKA (total knee arthroplasty)  06/19/2018    Active  MNETT

## 2018-06-19 NOTE — DISCHARGE NOTE ADULT - MEDICATION SUMMARY - MEDICATIONS TO STOP TAKING
I will STOP taking the medications listed below when I get home from the hospital:    melatonin 3 mg oral tablet  -- 1 tab(s) by mouth once a day (at bedtime), As needed, Insomnia

## 2018-06-19 NOTE — DISCHARGE NOTE ADULT - ADDITIONAL INSTRUCTIONS
The patient will be seen in the office in 3 weeks for wound check. Sutures/Staples/Tape will be removed at that time. Patient may shower after post-op day #3. The dressing is to be removed on 6/26/18. IF THE DRESSING BECOMES SOILED BEFORE THE REMOVAL DATE, CHANGE WITH A SIMILAR DRESSING. IF THE DRESSING BECOMES STAINED WITH DISCHARGE, CONTACT THE OFFICE FOR FURTHER DIRECTIONS. The patient will contact the office if the wound becomes red, has increasing pain, develops bleeding or discharge, an injury occurs, or has other concerns. The patient will continue PT consistent with total knee replacement. The patient will continue LOVENOX for 2 weeks and then begin 325mg TWICE daily for 4 weeks for blood clot prevention. The patient will take OXYCODONE AND TYLENOL for pain control and titrate according to prescription and patient needs. The patient will take Colace while taking oxycodone to prevent narcotic associated constipation.  Additionally, increase water intake (drink at least 8 glasses of water daily) and try adding fiber to the diet by eating fruits, vegetables and foods that are rich in grains. If constipation is experienced, contact the medical/primary care provider to discuss further treatment options. The patient is FULL weight bearing. Elevation of the lower leg is recommended to reduce swelling. The patient will be seen in the office in 3 weeks for wound check. Tape will be removed at that time. Patient may shower after post-op day #5. The dressing is to be removed on 6/29/18. IF THE DRESSING BECOMES SOILED BEFORE THE REMOVAL DATE, CHANGE WITH A SIMILAR DRESSING. IF THE DRESSING BECOMES STAINED WITH DISCHARGE, CONTACT THE OFFICE FOR FURTHER DIRECTIONS. The patient will contact the office if the wound becomes red, has increasing pain, develops bleeding or discharge, an injury occurs, or has other concerns. The patient will continue PT consistent with total knee replacement. The patient will continue LOVENOX for 2 weeks and then begin 325mg TWICE daily for 4 weeks for blood clot prevention. The patient will take OXYCODONE AND TYLENOL for pain control and titrate according to prescription and patient needs. The patient will take Senna S while taking oxycodone to prevent narcotic associated constipation.  Additionally, increase water intake (drink at least 8 glasses of water daily) and try adding fiber to the diet by eating fruits, vegetables and foods that are rich in grains. If constipation is experienced, contact the medical/primary care provider to discuss further treatment options. The patient is FULL weight bearing. Elevation of the lower leg is recommended to reduce swelling.

## 2018-06-19 NOTE — DISCHARGE NOTE ADULT - MEDICATION SUMMARY - MEDICATIONS TO TAKE
I will START or STAY ON the medications listed below when I get home from the hospital:    vitamin d  -- 2000 milligram(s)  once a day  -- Indication: For Home med    oxyCODONE 10 mg oral tablet  -- 1 tab(s) by mouth every 4 to 6 hours, As Needed -Mild Pain (1 - 3) MDD:6  -- Indication: For Prn pain - Do NOT take concomitantly with klonopin     ibuprofen 400 mg oral tablet  -- 1 tab(s) by mouth every 4 hours, As needed, mild pain  -- Indication: For Prn pain    Aspirin Enteric Coated 325 mg oral delayed release tablet  -- 1 tab(s) by mouth 2 times a day   -- Swallow whole.  Do not crush.  Take with food or milk.    -- Indication: For DVTP post lovenox therapy    lisinopril 40 mg oral tablet  -- orally 2 times a day  -- Indication: For Home med    enoxaparin 30 mg/0.3 mL injectable solution  -- 30 milligram(s) injectable 2 times a day  -- Indication: For DVTP    clonazePAM 1 mg oral tablet  -- 0.5 milligram(s) by mouth 3 times a day while on narcotics, as per Dr Crowell  -- Indication: For Home med- decrease dose by half  while taking narcotics    hydrOXYzine hydrochloride 50 mg oral tablet  -- 1 tab(s) by mouth 3 times a day  -- Indication: For Home med    Librium 25 mg oral capsule  -- 3 tab(s) by mouth once a day for the next six days.  (Due to alcohol abuse)  -- Indication: For Home med    Xopenex HFA 45 mcg/inh inhalation aerosol  -- 2 puff(s) inhaled every 4 hours, As Needed  -- Indication: For Home med    albuterol  -- Indication: For Home med    Norvasc 5 mg oral tablet  -- 1 tab(s) by mouth 2 times a day  -- Indication: For Home med    EpiPen Auto-Injector 0.3 mg injectable kit  -- Indication: For Home med    Senna S 50 mg-8.6 mg oral tablet  -- 2 tab(s) by mouth once a day (at bedtime)   -- Medication should be taken with plenty of water.    -- Indication: For constipation prevention    potassium chloride 10 mEq oral tablet, extended release  -- Indication: For Home med    ocular lubricant ophthalmic solution  --  to each affected eye   -- Indication: For Home med    pantoprazole 40 mg oral delayed release tablet  -- 1 tab(s) by mouth once a day  -- Indication: For Home med    conjugated estrogens 0.3 mg oral tablet  -- 1 tab(s) by mouth once a day  -- Indication: For Home med    levothyroxine 137 mcg (0.137 mg) oral tablet  -- orally every other day  -- Indication: For Home med    levothyroxine 150 mcg (0.15 mg) oral tablet  -- orally every other day  -- Indication: For Home med    Multiple Vitamins oral tablet  -- 1 tab(s) by mouth once a day  -- Indication: For Home med    folic acid 1 mg oral tablet  -- 1 tab(s) by mouth once a day  -- Indication: For Home med

## 2018-06-19 NOTE — DISCHARGE NOTE ADULT - CARE PLAN
Principal Discharge DX:	Osteoarthritis  Goal:	Resume ADLs  Assessment and plan of treatment:	physical therapy, pain control

## 2018-06-19 NOTE — CONSULT NOTE ADULT - PROBLEM SELECTOR RECOMMENDATION 9
S/P R TKA , POD # 0  PT/OT/pain mgmt  DVT prophylaxis- as per ortho  Abx as per SCIP  Incentive spirometry  Prophylaxis of opioid  induced constipation stable , continue Albuterol PRN

## 2018-06-19 NOTE — CONSULT NOTE ADULT - PROBLEM SELECTOR RECOMMENDATION 2
continue home meds with parameters continue home meds with parameters. Presently patient with postop hypotension - will continue ivf .

## 2018-06-19 NOTE — DISCHARGE NOTE ADULT - PATIENT PORTAL LINK FT
You can access the [facility] Portal, offered by [facility], by registering with the following website: [email or web address]

## 2018-06-19 NOTE — DISCHARGE NOTE ADULT - HOSPITAL COURSE
The patient underwent a RIGHT TOTAL KNEE REPLACEMENT on 6/19/18. The patient received antibiotics consistent with SCIP guidelines. The patient underwent the procedure and had no intra-operative complications. Post-operatively, the patient was seen by medicine and PT. The patient received LOVENOX for DVTP. The patient received pain medications per orthopedic pain managment protocol and the pain was appropriately controlled. The patient did not have any post-operative medical complications. The patient was discharged in stable condition.

## 2018-06-19 NOTE — CONSULT NOTE ADULT - ASSESSMENT
This is a 67 y.o female with  two year history of right knee pain for which she has undergone injections for in the past( cortisone and gel ) , was taking Aleve with some relief . Pain got worst lately .  She is now s/p R TKA , POD # 0

## 2018-06-19 NOTE — DISCHARGE NOTE ADULT - MEDICATION SUMMARY - MEDICATIONS TO CHANGE
I will SWITCH the dose or number of times a day I take the medications listed below when I get home from the hospital:    clonazePAM 1 mg oral tablet  -- 1 tab(s) by mouth 3 times a day

## 2018-06-20 LAB
ANION GAP SERPL CALC-SCNC: 11 MMOL/L — SIGNIFICANT CHANGE UP (ref 5–17)
BUN SERPL-MCNC: 7 MG/DL — LOW (ref 8–20)
CALCIUM SERPL-MCNC: 8.6 MG/DL — SIGNIFICANT CHANGE UP (ref 8.6–10.2)
CHLORIDE SERPL-SCNC: 98 MMOL/L — SIGNIFICANT CHANGE UP (ref 98–107)
CO2 SERPL-SCNC: 25 MMOL/L — SIGNIFICANT CHANGE UP (ref 22–29)
CREAT SERPL-MCNC: 0.8 MG/DL — SIGNIFICANT CHANGE UP (ref 0.5–1.3)
GLUCOSE SERPL-MCNC: 125 MG/DL — HIGH (ref 70–115)
HCT VFR BLD CALC: 34.7 % — LOW (ref 37–47)
HGB BLD-MCNC: 11.3 G/DL — LOW (ref 12–16)
MAGNESIUM SERPL-MCNC: 1.5 MG/DL — LOW (ref 1.6–2.6)
MCHC RBC-ENTMCNC: 29.4 PG — SIGNIFICANT CHANGE UP (ref 27–31)
MCHC RBC-ENTMCNC: 32.6 G/DL — SIGNIFICANT CHANGE UP (ref 32–36)
MCV RBC AUTO: 90.4 FL — SIGNIFICANT CHANGE UP (ref 81–99)
PHOSPHATE SERPL-MCNC: 3 MG/DL — SIGNIFICANT CHANGE UP (ref 2.4–4.7)
PLATELET # BLD AUTO: 270 K/UL — SIGNIFICANT CHANGE UP (ref 150–400)
POTASSIUM SERPL-MCNC: 4.3 MMOL/L — SIGNIFICANT CHANGE UP (ref 3.5–5.3)
POTASSIUM SERPL-SCNC: 4.3 MMOL/L — SIGNIFICANT CHANGE UP (ref 3.5–5.3)
RBC # BLD: 3.84 M/UL — LOW (ref 4.4–5.2)
RBC # FLD: 15.8 % — HIGH (ref 11–15.6)
SODIUM SERPL-SCNC: 134 MMOL/L — LOW (ref 135–145)
WBC # BLD: 8.6 K/UL — SIGNIFICANT CHANGE UP (ref 4.8–10.8)
WBC # FLD AUTO: 8.6 K/UL — SIGNIFICANT CHANGE UP (ref 4.8–10.8)

## 2018-06-20 PROCEDURE — 99232 SBSQ HOSP IP/OBS MODERATE 35: CPT

## 2018-06-20 RX ORDER — OXYCODONE HYDROCHLORIDE 5 MG/1
15 TABLET ORAL EVERY 12 HOURS
Qty: 0 | Refills: 0 | Status: DISCONTINUED | OUTPATIENT
Start: 2018-06-20 | End: 2018-06-21

## 2018-06-20 RX ORDER — MORPHINE SULFATE 50 MG/1
4 CAPSULE, EXTENDED RELEASE ORAL EVERY 4 HOURS
Qty: 0 | Refills: 0 | Status: DISCONTINUED | OUTPATIENT
Start: 2018-06-20 | End: 2018-06-22

## 2018-06-20 RX ORDER — OXYCODONE HYDROCHLORIDE 5 MG/1
15 TABLET ORAL
Qty: 0 | Refills: 0 | Status: DISCONTINUED | OUTPATIENT
Start: 2018-06-20 | End: 2018-06-22

## 2018-06-20 RX ORDER — SENNA PLUS 8.6 MG/1
2 TABLET ORAL AT BEDTIME
Qty: 0 | Refills: 0 | Status: DISCONTINUED | OUTPATIENT
Start: 2018-06-20 | End: 2018-06-22

## 2018-06-20 RX ORDER — OXYCODONE HYDROCHLORIDE 5 MG/1
10 TABLET ORAL
Qty: 0 | Refills: 0 | Status: DISCONTINUED | OUTPATIENT
Start: 2018-06-20 | End: 2018-06-22

## 2018-06-20 RX ORDER — ACETAMINOPHEN 500 MG
650 TABLET ORAL EVERY 6 HOURS
Qty: 0 | Refills: 0 | Status: DISCONTINUED | OUTPATIENT
Start: 2018-06-20 | End: 2018-06-21

## 2018-06-20 RX ORDER — IBUPROFEN 200 MG
400 TABLET ORAL EVERY 4 HOURS
Qty: 0 | Refills: 0 | Status: DISCONTINUED | OUTPATIENT
Start: 2018-06-20 | End: 2018-06-22

## 2018-06-20 RX ORDER — SENNA PLUS 8.6 MG/1
2 TABLET ORAL AT BEDTIME
Qty: 0 | Refills: 0 | Status: DISCONTINUED | OUTPATIENT
Start: 2018-06-20 | End: 2018-06-20

## 2018-06-20 RX ADMIN — OXYCODONE HYDROCHLORIDE 10 MILLIGRAM(S): 5 TABLET ORAL at 06:40

## 2018-06-20 RX ADMIN — Medication 50 MILLIGRAM(S): at 15:57

## 2018-06-20 RX ADMIN — MORPHINE SULFATE 4 MILLIGRAM(S): 50 CAPSULE, EXTENDED RELEASE ORAL at 23:00

## 2018-06-20 RX ADMIN — Medication 1 MILLIGRAM(S): at 15:56

## 2018-06-20 RX ADMIN — PANTOPRAZOLE SODIUM 40 MILLIGRAM(S): 20 TABLET, DELAYED RELEASE ORAL at 09:48

## 2018-06-20 RX ADMIN — MORPHINE SULFATE 4 MILLIGRAM(S): 50 CAPSULE, EXTENDED RELEASE ORAL at 22:35

## 2018-06-20 RX ADMIN — Medication 1 MILLIGRAM(S): at 11:54

## 2018-06-20 RX ADMIN — OXYCODONE HYDROCHLORIDE 15 MILLIGRAM(S): 5 TABLET ORAL at 17:30

## 2018-06-20 RX ADMIN — OXYCODONE HYDROCHLORIDE 15 MILLIGRAM(S): 5 TABLET ORAL at 22:00

## 2018-06-20 RX ADMIN — OXYCODONE HYDROCHLORIDE 10 MILLIGRAM(S): 5 TABLET ORAL at 00:49

## 2018-06-20 RX ADMIN — OXYCODONE HYDROCHLORIDE 15 MILLIGRAM(S): 5 TABLET ORAL at 18:00

## 2018-06-20 RX ADMIN — OXYCODONE HYDROCHLORIDE 15 MILLIGRAM(S): 5 TABLET ORAL at 09:47

## 2018-06-20 RX ADMIN — Medication 100 MILLIGRAM(S): at 22:22

## 2018-06-20 RX ADMIN — ENOXAPARIN SODIUM 30 MILLIGRAM(S): 100 INJECTION SUBCUTANEOUS at 17:30

## 2018-06-20 RX ADMIN — Medication 1 MILLIGRAM(S): at 22:22

## 2018-06-20 RX ADMIN — Medication 150 MICROGRAM(S): at 06:40

## 2018-06-20 RX ADMIN — Medication 100 MILLIGRAM(S): at 11:48

## 2018-06-20 RX ADMIN — MORPHINE SULFATE 4 MILLIGRAM(S): 50 CAPSULE, EXTENDED RELEASE ORAL at 12:30

## 2018-06-20 RX ADMIN — Medication 1 MILLIGRAM(S): at 06:40

## 2018-06-20 RX ADMIN — Medication 975 MILLIGRAM(S): at 16:30

## 2018-06-20 RX ADMIN — Medication 650 MILLIGRAM(S): at 20:45

## 2018-06-20 RX ADMIN — Medication 50 MILLIGRAM(S): at 22:23

## 2018-06-20 RX ADMIN — SODIUM CHLORIDE 125 MILLILITER(S): 9 INJECTION, SOLUTION INTRAVENOUS at 04:24

## 2018-06-20 RX ADMIN — Medication 100 MILLIGRAM(S): at 15:57

## 2018-06-20 RX ADMIN — Medication 650 MILLIGRAM(S): at 21:45

## 2018-06-20 RX ADMIN — Medication 975 MILLIGRAM(S): at 06:39

## 2018-06-20 RX ADMIN — Medication 10 MILLIEQUIVALENT(S): at 11:54

## 2018-06-20 RX ADMIN — ENOXAPARIN SODIUM 30 MILLIGRAM(S): 100 INJECTION SUBCUTANEOUS at 06:39

## 2018-06-20 RX ADMIN — Medication 1 TABLET(S): at 11:54

## 2018-06-20 RX ADMIN — Medication 975 MILLIGRAM(S): at 15:57

## 2018-06-20 RX ADMIN — OXYCODONE HYDROCHLORIDE 15 MILLIGRAM(S): 5 TABLET ORAL at 20:45

## 2018-06-20 RX ADMIN — MORPHINE SULFATE 4 MILLIGRAM(S): 50 CAPSULE, EXTENDED RELEASE ORAL at 12:09

## 2018-06-20 RX ADMIN — OXYCODONE HYDROCHLORIDE 15 MILLIGRAM(S): 5 TABLET ORAL at 10:20

## 2018-06-20 RX ADMIN — OXYCODONE HYDROCHLORIDE 10 MILLIGRAM(S): 5 TABLET ORAL at 01:40

## 2018-06-20 NOTE — PROGRESS NOTE ADULT - SUBJECTIVE AND OBJECTIVE BOX
68 yo F s/p Right TKR with spinal anesthesia POD#1   No complaints, VSS, MAEx4, ambulating.   Denies any PONV, headache or paresthesia.   No numbness or weakness noted.  Pain management satisfactory.   No apparent anesthesia complications noted.

## 2018-06-20 NOTE — PHYSICAL THERAPY INITIAL EVALUATION ADULT - ACTIVE RANGE OF MOTION EXAMINATION, REHAB EVAL
bilateral  lower extremity Active ROM was WFL (within functional limits)/efren. upper extremity Active ROM was WNL (within normal limits)

## 2018-06-20 NOTE — PROGRESS NOTE ADULT - SUBJECTIVE AND OBJECTIVE BOX
Patient seen and examined . S/p R TKA   , POD # 1    CC : R knee pain         PAST MEDICAL & SURGICAL HISTORY:  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  Risk factors for obstructive sleep apnea  Alcoholism  UTI (urinary tract infection): frequent, last treated on 12/10/13 X 10 days with Bactrim, last cultured on  which was negative  Meniscus tear: left knee  Osteoarthritis  MVP (mitral valve prolapse)  Lactose intolerance  Fibromyalgia  Sjoegren syndrome  Lupus: discoid type  Chronic urticaria  Diverticulitis  Anxiety  H/O allergy: environmental, animal  GERD (gastroesophageal reflux disease)  IBS (irritable bowel syndrome)  Hypothyroidism: h/o Hashimoto Thyroiditis  HTN (hypertension)  Prediabetes  Asthma: well controlled    History of total left knee replacement  S/P lumpectomy of breast: right breast -benign  13  S/P colon resection: 13  Trauma: s/p multiple repair of uterine trauma secondary to an accident as a child with a broom  S/P D&C  Obesity  S/P cholecystectomy:   H/O: hysterectomy: LOUISE BSO    S/P appendectomy:   H/O ovarian cystectomy      MEDICATIONS  (STANDING):  acetaminophen   Tablet. 650 milliGRAM(s) Oral every 6 hours  acetaminophen   Tablet. 975 milliGRAM(s) Oral every 8 hours  amLODIPine   Tablet 5 milliGRAM(s) Oral daily  clonazePAM Tablet 1 milliGRAM(s) Oral three times a day  docusate sodium 100 milliGRAM(s) Oral three times a day  enoxaparin Injectable 30 milliGRAM(s) SubCutaneous every 12 hours  folic acid 1 milliGRAM(s) Oral daily  folic acid 1 milliGRAM(s) Oral daily  hydrOXYzine hydrochloride 50 milliGRAM(s) Oral three times a day  lactated ringers. 1000 milliLiter(s) (125 mL/Hr) IV Continuous <Continuous>  levothyroxine 137 MICROGram(s) Oral daily  levothyroxine 150 MICROGram(s) Oral daily  multivitamin 1 Tablet(s) Oral daily  multivitamin 1 Tablet(s) Oral daily  oxyCODONE  ER Tablet 15 milliGRAM(s) Oral every 12 hours  pantoprazole    Tablet 40 milliGRAM(s) Oral before breakfast  potassium chloride    Tablet ER 10 milliEquivalent(s) Oral daily  senna 2 Tablet(s) Oral at bedtime  thiamine 100 milliGRAM(s) Oral daily    MEDICATIONS  (PRN):  acetaminophen   Tablet 650 milliGRAM(s) Oral every 6 hours PRN For Temp over 38.3 C (100.94 F)  ALBUTerol    90 MICROgram(s) HFA Inhaler 2 Puff(s) Inhalation every 6 hours PRN Shortness of Breath and/or Wheezing  aluminum hydroxide/magnesium hydroxide/simethicone Suspension 30 milliLiter(s) Oral four times a day PRN Indigestion  artificial  tears Solution 1 Drop(s) Both EYES four times a day PRN Dry Eyes  ibuprofen  Tablet 400 milliGRAM(s) Oral every 4 hours PRN mild pain  LORazepam   Injectable 2 milliGRAM(s) IV Push every 1 hour PRN Symptom-triggered: each CIWA -Ar score 8 or GREATER  magnesium hydroxide Suspension 30 milliLiter(s) Oral daily PRN Constipation  morphine  - Injectable 4 milliGRAM(s) IV Push every 4 hours PRN breakthrough pain  ondansetron Injectable 4 milliGRAM(s) IV Push every 6 hours PRN Nausea and/or Vomiting  oxyCODONE    IR 10 milliGRAM(s) Oral every 3 hours PRN Mild Pain (1 - 3)  oxyCODONE    IR 15 milliGRAM(s) Oral every 3 hours PRN Moderate Pain (4 - 6)      LABS: Pending         RADIOLOGY & ADDITIONAL TESTS:  < from: Xray Knee 1 or 2 Views, Right (18 @ 11:45) >     EXAM:  KNEE-RIGHT                          PROCEDURE DATE:  2018          INTERPRETATION:  HISTORY: Postoperative  knee replacement.    Two views of the right knee are submitted.    Evaluation demonstrates the presence of a tricompartmental knee   replacement with the femoral, tibial and patellar components in proper   anatomic alignment. There is no fracture .       Impression:  Knee prosthetic components in proper anatomical alignment.              ELDON BULLOCK M.D., ATTENDING RADIOLOGIST  This document has been electronically signed. 2018  2:13PM        < end of copied text >        REVIEW OF SYSTEMS:    CONSTITUTIONAL: No fever, weight loss, or fatigue  EYES: No eye pain, visual disturbances, or discharge  ENMT:  No difficulty hearing, tinnitus, vertigo; No sinus or throat pain  NECK: No pain or stiffness  RESPIRATORY: No cough, wheezing, chills or hemoptysis; No shortness of breath  CARDIOVASCULAR: No chest pain, palpitations, dizziness, or leg swelling  GASTROINTESTINAL: No abdominal or epigastric pain. No nausea, vomiting, or hematemesis; No diarrhea or constipation. No melena or hematochezia.  GENITOURINARY: No dysuria, frequency, hematuria, or incontinence  NEUROLOGICAL: No headaches, memory loss, loss of strength, numbness, or tremors  SKIN: No itching, burning, rashes, or lesions   LYMPH NODES: No enlarged glands  ENDOCRINE: No heat or cold intolerance; No hair loss  MUSCULOSKELETAL: R knee pain   PSYCHIATRIC: No depression, anxiety, mood swings, or difficulty sleeping  HEME/LYMPH: No easy bruising, or bleeding gums  ALLERGY AND IMMUNOLOGIC: No hives or eczema    Vital Signs Last 24 Hrs  T(C): 37.3 (2018 04:20), Max: 37.3 (2018 04:20)  T(F): 99.1 (2018 04:20), Max: 99.1 (2018 04:20)  HR: 85 (2018 04:20) (54 - 94)  BP: 107/67 (2018 04:20) (83/53 - 129/74)  BP(mean): --  RR: 18 (2018 04:20) (9 - 19)  SpO2: 98% (2018 04:20) (95% - 100%)  PHYSICAL EXAM:    GENERAL: NAD, well-groomed, well-developed  HEAD:  Atraumatic, Normocephalic  EYES: EOMI, PERRLA, conjunctiva and sclera clear  NECK: Supple, No JVD, Normal thyroid  NERVOUS SYSTEM:  Alert & Oriented X3, no focal deficit  CHEST/LUNG: CTA b/l ,  no  rales, rhonchi, wheezing, or rubs  HEART: Regular rate and rhythm; No murmurs, rubs, or gallops  ABDOMEN: Soft, Nontender, Nondistended; Bowel sounds present  EXTREMITIES:  2+ Peripheral Pulses, No clubbing, cyanosis, or edema , R knee dressing + , C/D/I  LYMPH: No lymphadenopathy noted  SKIN: No rashes or lesions Patient seen and examined . S/p R TKA   , POD # 1. Pain well controlled , feels nauseated , had episode of lightheadedness , mild headache  while sitting in the chair this am      CC : R knee pain well controlled , nausea , episode of lightheadedness  , mild headache         PAST MEDICAL & SURGICAL HISTORY:  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  Risk factors for obstructive sleep apnea  Alcoholism  Meniscus tear: left knee  Osteoarthritis  MVP (mitral valve prolapse)  Lactose intolerance  Fibromyalgia  Sjoegren syndrome  Lupus: discoid type  Chronic urticaria  Diverticulitis  Anxiety  H/O allergy: environmental, animal  GERD (gastroesophageal reflux disease)  IBS (irritable bowel syndrome)  Hypothyroidism: h/o Hashimoto Thyroiditis  HTN (hypertension)  Prediabetes  Asthma: well controlled    History of total left knee replacement  S/P lumpectomy of breast: right breast -benign  13  S/P colon resection: 13  Trauma: s/p multiple repair of uterine trauma secondary to an accident as a child with a broom  S/P D&C  Obesity  S/P cholecystectomy:   H/O: hysterectomy: KARL GIL    S/P appendectomy:   H/O ovarian cystectomy      MEDICATIONS  (STANDING):  acetaminophen   Tablet. 650 milliGRAM(s) Oral every 6 hours  acetaminophen   Tablet. 975 milliGRAM(s) Oral every 8 hours  amLODIPine   Tablet 5 milliGRAM(s) Oral daily  clonazePAM Tablet 1 milliGRAM(s) Oral three times a day  docusate sodium 100 milliGRAM(s) Oral three times a day  enoxaparin Injectable 30 milliGRAM(s) SubCutaneous every 12 hours  folic acid 1 milliGRAM(s) Oral daily  folic acid 1 milliGRAM(s) Oral daily  hydrOXYzine hydrochloride 50 milliGRAM(s) Oral three times a day  lactated ringers. 1000 milliLiter(s) (125 mL/Hr) IV Continuous <Continuous>  levothyroxine 137 MICROGram(s) Oral daily  levothyroxine 150 MICROGram(s) Oral daily  multivitamin 1 Tablet(s) Oral daily  multivitamin 1 Tablet(s) Oral daily  oxyCODONE  ER Tablet 15 milliGRAM(s) Oral every 12 hours  pantoprazole    Tablet 40 milliGRAM(s) Oral before breakfast  potassium chloride    Tablet ER 10 milliEquivalent(s) Oral daily  senna 2 Tablet(s) Oral at bedtime  thiamine 100 milliGRAM(s) Oral daily    MEDICATIONS  (PRN):  acetaminophen   Tablet 650 milliGRAM(s) Oral every 6 hours PRN For Temp over 38.3 C (100.94 F)  ALBUTerol    90 MICROgram(s) HFA Inhaler 2 Puff(s) Inhalation every 6 hours PRN Shortness of Breath and/or Wheezing  aluminum hydroxide/magnesium hydroxide/simethicone Suspension 30 milliLiter(s) Oral four times a day PRN Indigestion  artificial  tears Solution 1 Drop(s) Both EYES four times a day PRN Dry Eyes  ibuprofen  Tablet 400 milliGRAM(s) Oral every 4 hours PRN mild pain  LORazepam   Injectable 2 milliGRAM(s) IV Push every 1 hour PRN Symptom-triggered: each CIWA -Ar score 8 or GREATER  magnesium hydroxide Suspension 30 milliLiter(s) Oral daily PRN Constipation  morphine  - Injectable 4 milliGRAM(s) IV Push every 4 hours PRN breakthrough pain  ondansetron Injectable 4 milliGRAM(s) IV Push every 6 hours PRN Nausea and/or Vomiting  oxyCODONE    IR 10 milliGRAM(s) Oral every 3 hours PRN Mild Pain (1 - 3)  oxyCODONE    IR 15 milliGRAM(s) Oral every 3 hours PRN Moderate Pain (4 - 6)      LABS: Pending         RADIOLOGY & ADDITIONAL TESTS:  < from: Xray Knee 1 or 2 Views, Right (18 @ 11:45) >     EXAM:  KNEE-RIGHT                          PROCEDURE DATE:  2018          INTERPRETATION:  HISTORY: Postoperative  knee replacement.    Two views of the right knee are submitted.    Evaluation demonstrates the presence of a tricompartmental knee   replacement with the femoral, tibial and patellar components in proper   anatomic alignment. There is no fracture .       Impression:  Knee prosthetic components in proper anatomical alignment.              ELDON BULLOCK M.D., ATTENDING RADIOLOGIST  This document has been electronically signed. 2018  2:13PM        < end of copied text >        REVIEW OF SYSTEMS:    CONSTITUTIONAL: No fever, weight loss, or fatigue , episode of lightheadedness this am while sitting on chair , now resolved   EYES: No eye pain, visual disturbances, or discharge  ENMT:  No difficulty hearing, tinnitus, vertigo; No sinus or throat pain  NECK: No pain or stiffness  RESPIRATORY: No cough, wheezing, chills or hemoptysis; No shortness of breath  CARDIOVASCULAR: No chest pain, palpitations, dizziness, or leg swelling  GASTROINTESTINAL: No abdominal or epigastric pain, nausea +, no  vomiting, or hematemesis; No diarrhea or constipation. No melena or hematochezia.  GENITOURINARY: No dysuria, frequency, hematuria, or incontinence  NEUROLOGICAL: mild headaches+, no  memory loss, loss of strength, numbness, b/l mild hand tremor   SKIN: No itching, burning, rashes, or lesions   LYMPH NODES: No enlarged glands  ENDOCRINE: No heat or cold intolerance; No hair loss  MUSCULOSKELETAL: R knee pain well controlled   PSYCHIATRIC: No depression, anxiety, mood swings, or difficulty sleeping  HEME/LYMPH: No easy bruising, or bleeding gums  ALLERGY AND IMMUNOLOGIC: No hives or eczema    Vital Signs Last 24 Hrs  T(C): 37.3 (2018 04:20), Max: 37.3 (2018 04:20)  T(F): 99.1 (2018 04:20), Max: 99.1 (2018 04:20)  HR: 85 (2018 04:20) (54 - 94)  BP: 107/67 (2018 04:20) (83/53 - 129/74)  BP(mean): --  RR: 18 (2018 04:20) (9 - 19)  SpO2: 98% (2018 04:20) (95% - 100%)  PHYSICAL EXAM:    GENERAL: NAD, well-groomed, well-developed  HEAD:  Atraumatic, Normocephalic  EYES: EOMI, PERRLA, conjunctiva and sclera clear  NECK: Supple, No JVD, Normal thyroid  NERVOUS SYSTEM:  Alert & Oriented X3, no focal deficit , b/l hand tremor +   CHEST/LUNG: CTA b/l ,  no  rales, rhonchi, wheezing, or rubs  HEART: Regular rate and rhythm; No murmurs, rubs, or gallops  ABDOMEN: Soft, Nontender, Nondistended; Bowel sounds present  EXTREMITIES:  2+ Peripheral Pulses, No clubbing, cyanosis, or edema , R knee dressing + , C/D/I  LYMPH: No lymphadenopathy noted  SKIN: No rashes or lesions

## 2018-06-20 NOTE — OCCUPATIONAL THERAPY INITIAL EVALUATION ADULT - ADDITIONAL COMMENTS
Pt lives in single level apartment with 1 JHON[address] and no steps inside; bedroom and bathroom are on ground level. Bathroom has bathtub with doors. Pt owns RW, cane, raised toilet seat, shower chair. Pt is right handed. Pt drives. Pt states she has no supports locally; daughter lives in [place] and sister lives in [place].

## 2018-06-20 NOTE — PHYSICAL THERAPY INITIAL EVALUATION ADULT - ADDITIONAL COMMENTS
Pt lives in a Apt  with 1 step to enter with 0 rails and 0  stairs inside.  Pt owns medical equipment: GRIS, Commta, Shower Chair   Pt lives with: Alone

## 2018-06-20 NOTE — PHYSICAL THERAPY INITIAL EVALUATION ADULT - GAIT TRAINING, PT EVAL
Time Frame: 2- 3 days   Goal:   Independent  with RW x  250 feet / Stairs: pt will navigate 1  stairs with 0 rail independently

## 2018-06-20 NOTE — PROGRESS NOTE ADULT - ASSESSMENT
This is a 67 y.o female with  two year history of right knee pain for which she has undergone injections for in the past( cortisone and gel ) , was taking Aleve with some relief . Pain got worst lately .  She is now s/p R TKA , POD # 1     Problem/Recommendation - 1:  Problem: Unilateral primary osteoarthritis, right knee. Recommendation: S/P R TKA .  PT/OT/pain mgmt  DVT prophylaxis- as per ortho  Abx as per SCIP  Incentive spirometry  Prophylaxis of opioid  induced constipation.     Problem/Recommendation - 2:  ·  Problem: Essential hypertension.  Recommendation: continue home meds with parameters. Presently patient with postop hypotension - will continue ivf .      Problem/Recommendation - 3:  ·  Problem: Acquired hypothyroidism.  Recommendation: continue Synthroid.      Problem/Recommendation - 4:  ·  Problem: Gastroesophageal reflux disease, esophagitis presence not specified.  Recommendation: continue PPI.      Problem/Recommendation - 5:  ·  Problem: Prediabetes.  Recommendation: Diet - follow up with PMD for further mgmt.      Problem/Recommendation - 6:  Problem: Anxiety. Recommendation: continue Xanax.     Problem/Recommendation - 7:  Problem: Alcoholism. Recommendation: Patient state she was counseled to quit  , last drink was 2 wks ago , was treated with Librium for Alcohol withdrawal prophylaxis - will closely observe . Will have patient on CIWA protocol .     Problem/Recommendation - 8:  Problem: Fibromyalgia. Recommendation: history off.     Problem/Recommendation - 9:  Problem: Asthma. Recommendation: stable , continue Albuterol PRN.    Awaiting for am labs - will follow . This is a 67 y.o female with  two year history of right knee pain for which she has undergone injections for in the past( cortisone and gel ) , was taking Aleve with some relief . Pain got worst lately .  She is now s/p R TKA , POD # 1     Problem/Recommendation - 1:  Problem: Unilateral primary osteoarthritis, right knee. Recommendation: S/P R TKA .  PT/OT/pain mgmt  DVT prophylaxis- as per ortho  Abx as per SCIP  Incentive spirometry  Prophylaxis of opioid  induced constipation.     Problem/Recommendation - 2:  ·  Problem: Essential hypertension.  Recommendation: continue home meds with parameters. Presently patient with postop hypotension - will continue ivf .      Problem/Recommendation - 3:  ·  Problem: Acquired hypothyroidism.  Recommendation: continue Synthroid.      Problem/Recommendation - 4:  ·  Problem: Gastroesophageal reflux disease, esophagitis presence not specified.  Recommendation: continue PPI.      Problem/Recommendation - 5:  ·  Problem: Prediabetes.  Recommendation: Diet - follow up with PMD for further mgmt.      Problem/Recommendation - 6:  Problem: Anxiety. Recommendation: continue Xanax.     Problem/Recommendation - 7:  Problem: Alcoholism. Recommendation: Patient state she was counseled to quit  , last drink was 2 wks ago , was treated with Librium for Alcohol withdrawal prophylaxis - will closely observe . Continue  CIWA protocol .      Problem/Recommendation - 8:  Problem: Fibromyalgia. Recommendation: history off.     Problem/Recommendation - 9:  Problem: Asthma. Recommendation: stable , continue Albuterol PRN.    Problem / Plan - 10: Postoperative nausea / HA / lightheadedness - likely secondary to opioids - continue Zofran PRN / IVF     Awaiting for am labs - will follow .

## 2018-06-20 NOTE — OCCUPATIONAL THERAPY INITIAL EVALUATION ADULT - SENSORY TESTS
pt denies changes with sensation; pt with + bilateral pedal pulses; pt with capillary refill in bilateral toes

## 2018-06-20 NOTE — PROGRESS NOTE ADULT - SUBJECTIVE AND OBJECTIVE BOX
Ortho Post Op Check    Name: BEAU LOVE    MR #: [id number]    Procedure: Right total knee arthroplasty   Surgeon: Dr Ruff    Pt mildly uncomfortable with complaints of pain   Denies CP, SOB, N/V, numbness/tingling     General Exam:  Vital Signs Last 24 Hrs  T(C): 37.2 (06-21-18 @ 07:55), Max: 37.2 (06-21-18 @ 07:55)  T(F): 98.9 (06-21-18 @ 07:55), Max: 98.9 (06-21-18 @ 07:55)  HR: 89 (06-21-18 @ 07:55) (88 - 89)  BP: 128/70 (06-21-18 @ 07:55) (128/70 - 138/88)  BP(mean): --  RR: 19 (06-21-18 @ 07:55) (19 - 20)  SpO2: 97% (06-21-18 @ 07:55) (97% - 97%)    General: Pt Alert and oriented, NAD, controlled pain.  Right leg Dressings C/D/I. No bleeding.  Pulses: 2+ dorsalis pedis pulse. Cap refill < 2 sec.  Sensation: Grossly intact to light touch without deficit.  Motor: + EHL/FHL                          11.4   10.3  )-----------( 278      ( 21 Jun 2018 06:03 )             36.6   21 Jun 2018 06:03    138    |  99     |  6.0    ----------------------------<  105    4.4     |  27.0   |  0.69     Ca    9.1        21 Jun 2018 06:03  Phos  3.0       20 Jun 2018 10:04  Mg     1.5       20 Jun 2018 10:04      MEDICATIONS  (STANDING):  acetaminophen   Tablet. 975 milliGRAM(s) Oral every 8 hours  amLODIPine   Tablet 5 milliGRAM(s) Oral daily  clonazePAM Tablet 1 milliGRAM(s) Oral three times a day  docusate sodium 100 milliGRAM(s) Oral three times a day  enoxaparin Injectable 30 milliGRAM(s) SubCutaneous every 12 hours  folic acid 1 milliGRAM(s) Oral daily  hydrOXYzine hydrochloride 50 milliGRAM(s) Oral three times a day  lactated ringers. 1000 milliLiter(s) (125 mL/Hr) IV Continuous <Continuous>  levothyroxine 137 MICROGram(s) Oral daily  levothyroxine 150 MICROGram(s) Oral daily  lisinopril 40 milliGRAM(s) Oral daily  multivitamin 1 Tablet(s) Oral daily  oxyCODONE  ER Tablet 15 milliGRAM(s) Oral every 12 hours  pantoprazole    Tablet 40 milliGRAM(s) Oral before breakfast  potassium chloride    Tablet ER 10 milliEquivalent(s) Oral daily  senna 2 Tablet(s) Oral at bedtime  thiamine 100 milliGRAM(s) Oral daily    MEDICATIONS  (PRN):  ALBUTerol    90 MICROgram(s) HFA Inhaler 2 Puff(s) Inhalation every 6 hours PRN Shortness of Breath and/or Wheezing  aluminum hydroxide/magnesium hydroxide/simethicone Suspension 30 milliLiter(s) Oral four times a day PRN Indigestion  artificial  tears Solution 1 Drop(s) Both EYES four times a day PRN Dry Eyes  ibuprofen  Tablet 400 milliGRAM(s) Oral every 4 hours PRN mild pain  LORazepam   Injectable 2 milliGRAM(s) IV Push every 1 hour PRN Symptom-triggered: each CIWA -Ar score 8 or GREATER  magnesium hydroxide Suspension 30 milliLiter(s) Oral daily PRN Constipation  morphine  - Injectable 4 milliGRAM(s) IV Push every 4 hours PRN breakthrough pain  ondansetron Injectable 4 milliGRAM(s) IV Push every 6 hours PRN Nausea and/or Vomiting  oxyCODONE    IR 10 milliGRAM(s) Oral every 3 hours PRN Mild Pain (1 - 3)  oxyCODONE    IR 15 milliGRAM(s) Oral every 3 hours PRN Moderate Pain (4 - 6)      A/P: 67yFemale POD#0 s/p   - Stable  - Pain Control- will adjust meds  - DVT ppx: Lovenox  - PT eval pending  - Weight bearing status: WBAT

## 2018-06-21 LAB
ANION GAP SERPL CALC-SCNC: 12 MMOL/L — SIGNIFICANT CHANGE UP (ref 5–17)
BUN SERPL-MCNC: 6 MG/DL — LOW (ref 8–20)
CALCIUM SERPL-MCNC: 9.1 MG/DL — SIGNIFICANT CHANGE UP (ref 8.6–10.2)
CHLORIDE SERPL-SCNC: 99 MMOL/L — SIGNIFICANT CHANGE UP (ref 98–107)
CO2 SERPL-SCNC: 27 MMOL/L — SIGNIFICANT CHANGE UP (ref 22–29)
CREAT SERPL-MCNC: 0.69 MG/DL — SIGNIFICANT CHANGE UP (ref 0.5–1.3)
GLUCOSE SERPL-MCNC: 105 MG/DL — SIGNIFICANT CHANGE UP (ref 70–115)
HCT VFR BLD CALC: 36.6 % — LOW (ref 37–47)
HGB BLD-MCNC: 11.4 G/DL — LOW (ref 12–16)
MCHC RBC-ENTMCNC: 28.6 PG — SIGNIFICANT CHANGE UP (ref 27–31)
MCHC RBC-ENTMCNC: 31.1 G/DL — LOW (ref 32–36)
MCV RBC AUTO: 92 FL — SIGNIFICANT CHANGE UP (ref 81–99)
PLATELET # BLD AUTO: 278 K/UL — SIGNIFICANT CHANGE UP (ref 150–400)
POTASSIUM SERPL-MCNC: 4.4 MMOL/L — SIGNIFICANT CHANGE UP (ref 3.5–5.3)
POTASSIUM SERPL-SCNC: 4.4 MMOL/L — SIGNIFICANT CHANGE UP (ref 3.5–5.3)
RBC # BLD: 3.98 M/UL — LOW (ref 4.4–5.2)
RBC # FLD: 16.3 % — HIGH (ref 11–15.6)
SODIUM SERPL-SCNC: 138 MMOL/L — SIGNIFICANT CHANGE UP (ref 135–145)
WBC # BLD: 10.3 K/UL — SIGNIFICANT CHANGE UP (ref 4.8–10.8)
WBC # FLD AUTO: 10.3 K/UL — SIGNIFICANT CHANGE UP (ref 4.8–10.8)

## 2018-06-21 PROCEDURE — 99222 1ST HOSP IP/OBS MODERATE 55: CPT | Mod: GC

## 2018-06-21 PROCEDURE — 99233 SBSQ HOSP IP/OBS HIGH 50: CPT

## 2018-06-21 RX ORDER — CLONAZEPAM 1 MG
0.5 TABLET ORAL
Qty: 0 | Refills: 0 | Status: DISCONTINUED | OUTPATIENT
Start: 2018-06-21 | End: 2018-06-22

## 2018-06-21 RX ORDER — MAGNESIUM SULFATE 500 MG/ML
2 VIAL (ML) INJECTION ONCE
Qty: 0 | Refills: 0 | Status: COMPLETED | OUTPATIENT
Start: 2018-06-21 | End: 2018-06-21

## 2018-06-21 RX ADMIN — Medication 100 MILLIGRAM(S): at 13:17

## 2018-06-21 RX ADMIN — Medication 50 MILLIGRAM(S): at 22:39

## 2018-06-21 RX ADMIN — Medication 50 MILLIGRAM(S): at 06:19

## 2018-06-21 RX ADMIN — OXYCODONE HYDROCHLORIDE 15 MILLIGRAM(S): 5 TABLET ORAL at 06:29

## 2018-06-21 RX ADMIN — Medication 1 TABLET(S): at 12:04

## 2018-06-21 RX ADMIN — OXYCODONE HYDROCHLORIDE 10 MILLIGRAM(S): 5 TABLET ORAL at 23:07

## 2018-06-21 RX ADMIN — Medication 137 MICROGRAM(S): at 06:19

## 2018-06-21 RX ADMIN — Medication 975 MILLIGRAM(S): at 06:29

## 2018-06-21 RX ADMIN — Medication 0.5 MILLIGRAM(S): at 17:26

## 2018-06-21 RX ADMIN — OXYCODONE HYDROCHLORIDE 15 MILLIGRAM(S): 5 TABLET ORAL at 03:45

## 2018-06-21 RX ADMIN — Medication 975 MILLIGRAM(S): at 14:17

## 2018-06-21 RX ADMIN — Medication 1 MILLIGRAM(S): at 06:19

## 2018-06-21 RX ADMIN — Medication 10 MILLIEQUIVALENT(S): at 12:04

## 2018-06-21 RX ADMIN — PANTOPRAZOLE SODIUM 40 MILLIGRAM(S): 20 TABLET, DELAYED RELEASE ORAL at 06:19

## 2018-06-21 RX ADMIN — ENOXAPARIN SODIUM 30 MILLIGRAM(S): 100 INJECTION SUBCUTANEOUS at 17:26

## 2018-06-21 RX ADMIN — Medication 100 MILLIGRAM(S): at 06:18

## 2018-06-21 RX ADMIN — Medication 100 MILLIGRAM(S): at 22:38

## 2018-06-21 RX ADMIN — Medication 1 MILLIGRAM(S): at 12:04

## 2018-06-21 RX ADMIN — OXYCODONE HYDROCHLORIDE 15 MILLIGRAM(S): 5 TABLET ORAL at 02:45

## 2018-06-21 RX ADMIN — Medication 100 MILLIGRAM(S): at 12:04

## 2018-06-21 RX ADMIN — SENNA PLUS 2 TABLET(S): 8.6 TABLET ORAL at 22:38

## 2018-06-21 RX ADMIN — Medication 975 MILLIGRAM(S): at 22:38

## 2018-06-21 RX ADMIN — AMLODIPINE BESYLATE 5 MILLIGRAM(S): 2.5 TABLET ORAL at 06:19

## 2018-06-21 RX ADMIN — Medication 975 MILLIGRAM(S): at 23:30

## 2018-06-21 RX ADMIN — Medication 50 GRAM(S): at 13:17

## 2018-06-21 RX ADMIN — ENOXAPARIN SODIUM 30 MILLIGRAM(S): 100 INJECTION SUBCUTANEOUS at 06:18

## 2018-06-21 RX ADMIN — Medication 975 MILLIGRAM(S): at 13:17

## 2018-06-21 RX ADMIN — OXYCODONE HYDROCHLORIDE 10 MILLIGRAM(S): 5 TABLET ORAL at 23:45

## 2018-06-21 RX ADMIN — LISINOPRIL 40 MILLIGRAM(S): 2.5 TABLET ORAL at 06:19

## 2018-06-21 RX ADMIN — Medication 975 MILLIGRAM(S): at 06:28

## 2018-06-21 RX ADMIN — Medication 50 MILLIGRAM(S): at 14:26

## 2018-06-21 NOTE — PROGRESS NOTE ADULT - SUBJECTIVE AND OBJECTIVE BOX
[facility]    [id number]    History: 67y Female is status post right total knee arthroplasty on 6/19, POD # 02. Patient is doing well, experiencing some lightheadedness and headache, has not gotten out of bed much because of this. The patient's pain is controlled using the prescribed pain medications.  Denies  vomiting, chest pain, shortness of breath, abdominal pain or fever. No new complaints.  Pt wants to go to rehab         06-21    138  |  99  |  6.0<L>  ----------------------------<  105  4.4   |  27.0  |  0.69    Ca    9.1      21 Jun 2018 06:03  Phos  3.0     06-20  Mg     1.5     06-20              Physical exam: The right knee dressing is clean, dry and intact.  Dressing removed.  No drainage or discharge. No erythema is noted. No blistering. No ecchymosis.  New dressing placed.  The calf is supple nontender. No calf tenderness. Sensation to light touch is grossly intact distally. Motor function distally is 5/5. Extensor hallucis longus and flexor hallucis longus are intact. No foot drop. 2+ dorsalis pedis pulse. Capillary refill is less than 2 seconds. No cyanosis.    Primary Orthopedic Assessment:  • s/p RIGHT total knee replacement pod #2    Plan:   • DVT prophylaxis lovenox, including use of compression devices and ankle pumps  • Continue physical therapy  • Weightbearing as tolerated of the right lower extremity with assistance of a walker  • Incentive spirometry encouraged  • Pain control as clinically indicated  • Discharge planning – anticipated discharge is Home vs subacute rehabilitation

## 2018-06-21 NOTE — CONSULT NOTE ADULT - SUBJECTIVE AND OBJECTIVE BOX
cc: Patient is a 67y old  Female who underwent right total knee arthroplasty       HPI:  This is a 67 y.o female with history as below, two year history of right knee pain for which she has undergone injections for in the past.  She failed conservative treatment and was admitted to [facility] 18 for elective TKA. Tolerated procedure. WBAT. On Lovenox for DVT prophylaxis. 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  Risk factors for obstructive sleep apnea  Alcoholism  UTI (urinary tract infection): frequent, last treated on 12/10/13 X 10 days with Bactrim, last cultured on  which was negative  Meniscus tear: left knee  Osteoarthritis  MVP (mitral valve prolapse)  Lactose intolerance  Fibromyalgia  Sjoegren syndrome  Lupus: discoid type  Chronic urticaria  Diverticulitis  Anxiety  GERD (gastroesophageal reflux disease)  IBS (irritable bowel syndrome)  Hypothyroidism: h/o Hashimoto Thyroiditis  HTN (hypertension)  Prediabetes  Asthma: well controlled    History of total left knee replacement  S/P lumpectomy of breast: right breast -benign  13  S/P colon resection: 13  Trauma: s/p multiple repair of uterine trauma secondary to an accident as a child with a broom  S/P D&C  Obesity  S/P cholecystectomy:   H/O: hysterectomy: LOUISE, BSO    S/P appendectomy:   H/O ovarian cystectomy      FAMILY HISTORY:  Family history of diabetes mellitus (Father)  Family history of hypertension (Father)  Family history of coronary thrombosis (Sibling)  Family history of acute myocardial infarction (Father)  Family history of throat cancer (Mother)      SOCIAL HISTORY:  TOBACCO: denies history  ALCOHOL: denies abuse  IVDA: denies history    FUNCTIONAL, ENVIRONMENTAL HISTORY:  WORK HISTORY:   LIVES WITH:   HOME LAYOUT:   STAIRS TO ENTER:   STAIRS INSIDE:   FUNCTIONAL HISTORY: independent with ambulation and ADLs    Allergies    banana, coconut, pineapple, mustard, vinegar, sulfite (Other)  Benadryl (Hives)  beta blockers (Anaphylaxis)  Ceclor (Hives)  Cipro (Hives)  dairy products (Unknown)  fentanyl (Hives)  Flagyl (Hives)  hydrocodone (Other)  hydromorphone (Other)  Keflex (Hives)  lactose (Unknown)  penicillin (Anaphylaxis)  Tree Nuts (Unknown)    Intolerances        MEDICATIONS  (STANDING):  acetaminophen   Tablet. 975 milliGRAM(s) Oral every 8 hours  amLODIPine   Tablet 5 milliGRAM(s) Oral daily  clonazePAM Tablet 1 milliGRAM(s) Oral three times a day  docusate sodium 100 milliGRAM(s) Oral three times a day  enoxaparin Injectable 30 milliGRAM(s) SubCutaneous every 12 hours  folic acid 1 milliGRAM(s) Oral daily  hydrOXYzine hydrochloride 50 milliGRAM(s) Oral three times a day  lactated ringers. 1000 milliLiter(s) (125 mL/Hr) IV Continuous <Continuous>  levothyroxine 137 MICROGram(s) Oral daily  levothyroxine 150 MICROGram(s) Oral daily  lisinopril 40 milliGRAM(s) Oral daily  multivitamin 1 Tablet(s) Oral daily  oxyCODONE  ER Tablet 15 milliGRAM(s) Oral every 12 hours  pantoprazole    Tablet 40 milliGRAM(s) Oral before breakfast  potassium chloride    Tablet ER 10 milliEquivalent(s) Oral daily  senna 2 Tablet(s) Oral at bedtime  thiamine 100 milliGRAM(s) Oral daily    MEDICATIONS  (PRN):  ALBUTerol    90 MICROgram(s) HFA Inhaler 2 Puff(s) Inhalation every 6 hours PRN Shortness of Breath and/or Wheezing  aluminum hydroxide/magnesium hydroxide/simethicone Suspension 30 milliLiter(s) Oral four times a day PRN Indigestion  artificial  tears Solution 1 Drop(s) Both EYES four times a day PRN Dry Eyes  ibuprofen  Tablet 400 milliGRAM(s) Oral every 4 hours PRN mild pain  LORazepam   Injectable 2 milliGRAM(s) IV Push every 1 hour PRN Symptom-triggered: each CIWA -Ar score 8 or GREATER  magnesium hydroxide Suspension 30 milliLiter(s) Oral daily PRN Constipation  morphine  - Injectable 4 milliGRAM(s) IV Push every 4 hours PRN breakthrough pain  ondansetron Injectable 4 milliGRAM(s) IV Push every 6 hours PRN Nausea and/or Vomiting  oxyCODONE    IR 10 milliGRAM(s) Oral every 3 hours PRN Mild Pain (1 - 3)  oxyCODONE    IR 15 milliGRAM(s) Oral every 3 hours PRN Moderate Pain (4 - 6)      REVIEW OF SYSTEMS:    CONSTITUTIONAL: No fever, weight loss, or fatigue  EYES: No eye pain, visual disturbances, or discharge  RESPIRATORY: No cough,   CARDIOVASCULAR: No chest pain,   GASTROINTESTINAL: No abdominal or epigastric pain.  GENITOURINARY: No dysuria,   NEUROLOGICAL: No headaches,  SKIN: No rashes, or lesions   MUSCULOSKELETAL: s1s2+  PSYCHIATRIC: No depression,       Vital Signs Last 24 Hrs  T(C): 37.2 (2018 07:55), Max: 37.2 (2018 07:55)  T(F): 98.9 (2018 07:55), Max: 98.9 (2018 07:55)  HR: 89 (2018 07:55) (88 - 92)  BP: 128/70 (2018 07:55) (111/68 - 138/88)  BP(mean): --  RR: 19 (2018 07:55) (18 - 20)  SpO2: 97% (2018 07:55) (93% - 100%)    PHYSICAL EXAM:    GENERAL: NAD, well-groomed, well-developed  HEAD:  Atraumatic, Normocephalic  EYES: EOMI,   HEART: S1S2+  CHEST/LUNG: Clear to auscultation bilaterally;   ABDOMEN: Soft, Nontender, Nondistended; Bowel sounds present  EXTREMITIES:  2+ Peripheral Pulses, No clubbing, cyanosis, or edema  SKIN: No rashes or lesions      FUNCTIONAL EXAM:     LABS:                        11.4   10.3  )-----------( 278      ( 2018 06:03 )             36.6     06-21    138  |  99  |  6.0<L>  ----------------------------<  105  4.4   |  27.0  |  0.69    Ca    9.1      2018 06:03  Phos  3.0     06-20  Mg     1.5     06-20            RADIOLOGY & ADDITIONAL STUDIES: cc: Patient is a 67y old  Female who underwent right total knee arthroplasty       HPI:  This is a 67 y.o female with history as below, two year history of right knee pain for which she has undergone injections for in the past.  She failed conservative treatment and was admitted to [facility] 18 for elective TKA. Tolerated procedure. WBAT. On Lovenox for DVT prophylaxis. Rehab evaluation requested for further recommendations in view of slow progress with bedside therapy. 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  Risk factors for obstructive sleep apnea  Alcoholism  UTI (urinary tract infection): frequent, last treated on 12/10/13 X 10 days with Bactrim, last cultured on  which was negative  Meniscus tear: left knee  Osteoarthritis  MVP (mitral valve prolapse)  Lactose intolerance  Fibromyalgia  Sjoegren syndrome  Lupus: discoid type  Chronic urticaria  Diverticulitis  Anxiety  GERD (gastroesophageal reflux disease)  IBS (irritable bowel syndrome)  Hypothyroidism: h/o Hashimoto Thyroiditis  HTN (hypertension)  Prediabetes  Asthma: well controlled    History of total left knee replacement  S/P lumpectomy of breast: right breast -benign  13  S/P colon resection: 13  Trauma: s/p multiple repair of uterine trauma secondary to an accident as a child with a broom  S/P D&C  Obesity  S/P cholecystectomy:   H/O: hysterectomy: LOUISE, BSO    S/P appendectomy:   H/O ovarian cystectomy      FAMILY HISTORY:  Family history of diabetes mellitus (Father)  Family history of hypertension (Father)  Family history of coronary thrombosis (Sibling)  Family history of acute myocardial infarction (Father)  Family history of throat cancer (Mother)      SOCIAL HISTORY:  TOBACCO: denies history  ALCOHOL: + use- states that she has problems. Stopped early this month  IVDA: denies history    FUNCTIONAL, ENVIRONMENTAL HISTORY:  LIVES WITH: alone  HOME LAYOUT:   STAIRS TO ENTER: 1 step  FUNCTIONAL HISTORY: independent with ambulation and ADLs    Allergies    banana, coconut, pineapple, mustard, vinegar, sulfite (Other)  Benadryl (Hives)  beta blockers (Anaphylaxis)  Ceclor (Hives)  Cipro (Hives)  dairy products (Unknown)  fentanyl (Hives)  Flagyl (Hives)  hydrocodone (Other)  hydromorphone (Other)  Keflex (Hives)  lactose (Unknown)  penicillin (Anaphylaxis)  Tree Nuts (Unknown)    Intolerances        MEDICATIONS  (STANDING):  acetaminophen   Tablet. 975 milliGRAM(s) Oral every 8 hours  amLODIPine   Tablet 5 milliGRAM(s) Oral daily  clonazePAM Tablet 1 milliGRAM(s) Oral three times a day  docusate sodium 100 milliGRAM(s) Oral three times a day  enoxaparin Injectable 30 milliGRAM(s) SubCutaneous every 12 hours  folic acid 1 milliGRAM(s) Oral daily  hydrOXYzine hydrochloride 50 milliGRAM(s) Oral three times a day  lactated ringers. 1000 milliLiter(s) (125 mL/Hr) IV Continuous <Continuous>  levothyroxine 137 MICROGram(s) Oral daily  levothyroxine 150 MICROGram(s) Oral daily  lisinopril 40 milliGRAM(s) Oral daily  multivitamin 1 Tablet(s) Oral daily  oxyCODONE  ER Tablet 15 milliGRAM(s) Oral every 12 hours  pantoprazole    Tablet 40 milliGRAM(s) Oral before breakfast  potassium chloride    Tablet ER 10 milliEquivalent(s) Oral daily  senna 2 Tablet(s) Oral at bedtime  thiamine 100 milliGRAM(s) Oral daily    MEDICATIONS  (PRN):  ALBUTerol    90 MICROgram(s) HFA Inhaler 2 Puff(s) Inhalation every 6 hours PRN Shortness of Breath and/or Wheezing  aluminum hydroxide/magnesium hydroxide/simethicone Suspension 30 milliLiter(s) Oral four times a day PRN Indigestion  artificial  tears Solution 1 Drop(s) Both EYES four times a day PRN Dry Eyes  ibuprofen  Tablet 400 milliGRAM(s) Oral every 4 hours PRN mild pain  LORazepam   Injectable 2 milliGRAM(s) IV Push every 1 hour PRN Symptom-triggered: each CIWA -Ar score 8 or GREATER  magnesium hydroxide Suspension 30 milliLiter(s) Oral daily PRN Constipation  morphine  - Injectable 4 milliGRAM(s) IV Push every 4 hours PRN breakthrough pain  ondansetron Injectable 4 milliGRAM(s) IV Push every 6 hours PRN Nausea and/or Vomiting  oxyCODONE    IR 10 milliGRAM(s) Oral every 3 hours PRN Mild Pain (1 - 3)  oxyCODONE    IR 15 milliGRAM(s) Oral every 3 hours PRN Moderate Pain (4 - 6)      REVIEW OF SYSTEMS:    CONSTITUTIONAL: No fever, weight loss, + fatigue  EYES: No eye pain, visual disturbances, or discharge  RESPIRATORY: No cough,   CARDIOVASCULAR: No chest pain,   GASTROINTESTINAL: No abdominal or epigastric pain.  GENITOURINARY: No dysuria,   NEUROLOGICAL: No headaches,  SKIN: No rashes, or lesions   MUSCULOSKELETAL: right knee pain,   PSYCHIATRIC: No depression,       Vital Signs Last 24 Hrs  T(C): 37.2 (2018 07:55), Max: 37.2 (2018 07:55)  T(F): 98.9 (2018 07:55), Max: 98.9 (2018 07:55)  HR: 89 (2018 07:55) (88 - 92)  BP: 128/70 (2018 07:55) (111/68 - 138/88)  BP(mean): --  RR: 19 (2018 07:55) (18 - 20)  SpO2: 97% (2018 07:55) (93% - 100%)    PHYSICAL EXAM:    GENERAL: NAD, sleepy intermittently during session  HEAD:  Atraumatic, Normocephalic  EYES: EOMI,   HEART: S1S2+  CHEST/LUNG: Clear to auscultation bilaterally;   ABDOMEN: Soft, Nontender, Nondistended; Bowel sounds present  EXTREMITIES:  Right knee with honey comb dressing  motor UE 5/5, LLE 5/5, right HF, KE, 4/5, ankle 5/5       FUNCTIONAL EXAM: min assist with transfers and walking with walker    LABS:                        11.4   10.3  )-----------( 278      ( 2018 06:03 )             36.6     06-21    138  |  99  |  6.0<L>  ----------------------------<  105  4.4   |  27.0  |  0.69    Ca    9.1      2018 06:03  Phos  3.0     06-20  Mg     1.5     06-20    RADIOLOGY & ADDITIONAL STUDIES:    A/P:    67 year old female with medical comorbidities as listed above, Knee OA, now s/o right TKA  Bedside therapy limited due to lethargy/sleepiness  Needs constant verbal cues to stay awake. Recommend titrating pain meds as may be contributing to current status    Continue bedside therapy per TKA protocol. Recommend subacute rehab to improve overall function as patient lives alone.   If she does well with bedside PT in am, may consider dc home.   d/w SW    2. Mag low- replete per Primary team    Thank you cc: Patient is a 67y old  Female who underwent right total knee arthroplasty       HPI:  This is a 67 y.o female with history as below, two year history of right knee pain for which she has undergone injections for in the past.  She failed conservative treatment and was admitted to [facility] 18 for elective TKA. Tolerated procedure. WBAT. On Lovenox for DVT prophylaxis. Rehab evaluation requested for further recommendations in view of slow progress with bedside therapy. 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  Risk factors for obstructive sleep apnea  Alcoholism  UTI (urinary tract infection): frequent, last treated on 12/10/13 X 10 days with Bactrim, last cultured on  which was negative  Meniscus tear: left knee  Osteoarthritis  MVP (mitral valve prolapse)  Lactose intolerance  Fibromyalgia  Sjoegren syndrome  Lupus: discoid type  Chronic urticaria  Diverticulitis  Anxiety  GERD (gastroesophageal reflux disease)  IBS (irritable bowel syndrome)  Hypothyroidism: h/o Hashimoto Thyroiditis  HTN (hypertension)  Prediabetes  Asthma: well controlled    History of total left knee replacement  S/P lumpectomy of breast: right breast -benign  13  S/P colon resection: 13  Trauma: s/p multiple repair of uterine trauma secondary to an accident as a child with a broom  S/P D&C  Obesity  S/P cholecystectomy:   H/O: hysterectomy: LOUISE, BSO    S/P appendectomy:   H/O ovarian cystectomy      FAMILY HISTORY:  Family history of diabetes mellitus (Father)  Family history of hypertension (Father)  Family history of coronary thrombosis (Sibling)  Family history of acute myocardial infarction (Father)  Family history of throat cancer (Mother)      SOCIAL HISTORY:  TOBACCO: denies history  ALCOHOL: + use- states that she has problems. Stopped early this month  IVDA: denies history    FUNCTIONAL, ENVIRONMENTAL HISTORY:  LIVES WITH: alone  HOME LAYOUT:   STAIRS TO ENTER: 1 step  FUNCTIONAL HISTORY: independent with ambulation and ADLs    Allergies    banana, coconut, pineapple, mustard, vinegar, sulfite (Other)  Benadryl (Hives)  beta blockers (Anaphylaxis)  Ceclor (Hives)  Cipro (Hives)  dairy products (Unknown)  fentanyl (Hives)  Flagyl (Hives)  hydrocodone (Other)  hydromorphone (Other)  Keflex (Hives)  lactose (Unknown)  penicillin (Anaphylaxis)  Tree Nuts (Unknown)    Intolerances        MEDICATIONS  (STANDING):  acetaminophen   Tablet. 975 milliGRAM(s) Oral every 8 hours  amLODIPine   Tablet 5 milliGRAM(s) Oral daily  clonazePAM Tablet 1 milliGRAM(s) Oral three times a day  docusate sodium 100 milliGRAM(s) Oral three times a day  enoxaparin Injectable 30 milliGRAM(s) SubCutaneous every 12 hours  folic acid 1 milliGRAM(s) Oral daily  hydrOXYzine hydrochloride 50 milliGRAM(s) Oral three times a day  lactated ringers. 1000 milliLiter(s) (125 mL/Hr) IV Continuous <Continuous>  levothyroxine 137 MICROGram(s) Oral daily  levothyroxine 150 MICROGram(s) Oral daily  lisinopril 40 milliGRAM(s) Oral daily  multivitamin 1 Tablet(s) Oral daily  oxyCODONE  ER Tablet 15 milliGRAM(s) Oral every 12 hours  pantoprazole    Tablet 40 milliGRAM(s) Oral before breakfast  potassium chloride    Tablet ER 10 milliEquivalent(s) Oral daily  senna 2 Tablet(s) Oral at bedtime  thiamine 100 milliGRAM(s) Oral daily    MEDICATIONS  (PRN):  ALBUTerol    90 MICROgram(s) HFA Inhaler 2 Puff(s) Inhalation every 6 hours PRN Shortness of Breath and/or Wheezing  aluminum hydroxide/magnesium hydroxide/simethicone Suspension 30 milliLiter(s) Oral four times a day PRN Indigestion  artificial  tears Solution 1 Drop(s) Both EYES four times a day PRN Dry Eyes  ibuprofen  Tablet 400 milliGRAM(s) Oral every 4 hours PRN mild pain  LORazepam   Injectable 2 milliGRAM(s) IV Push every 1 hour PRN Symptom-triggered: each CIWA -Ar score 8 or GREATER  magnesium hydroxide Suspension 30 milliLiter(s) Oral daily PRN Constipation  morphine  - Injectable 4 milliGRAM(s) IV Push every 4 hours PRN breakthrough pain  ondansetron Injectable 4 milliGRAM(s) IV Push every 6 hours PRN Nausea and/or Vomiting  oxyCODONE    IR 10 milliGRAM(s) Oral every 3 hours PRN Mild Pain (1 - 3)  oxyCODONE    IR 15 milliGRAM(s) Oral every 3 hours PRN Moderate Pain (4 - 6)      REVIEW OF SYSTEMS:    CONSTITUTIONAL: No fever, weight loss, + fatigue  EYES: No eye pain, visual disturbances, or discharge  RESPIRATORY: No cough,   CARDIOVASCULAR: No chest pain,   GASTROINTESTINAL: No abdominal or epigastric pain.  GENITOURINARY: No dysuria,   NEUROLOGICAL: No headaches,  SKIN: No rashes, or lesions   MUSCULOSKELETAL: right knee pain,   PSYCHIATRIC: No depression,       Vital Signs Last 24 Hrs  T(C): 37.2 (2018 07:55), Max: 37.2 (2018 07:55)  T(F): 98.9 (2018 07:55), Max: 98.9 (2018 07:55)  HR: 89 (2018 07:55) (88 - 92)  BP: 128/70 (2018 07:55) (111/68 - 138/88)  BP(mean): --  RR: 19 (2018 07:55) (18 - 20)  SpO2: 97% (2018 07:55) (93% - 100%)    PHYSICAL EXAM:    GENERAL: NAD, sleepy intermittently during session  HEAD:  Atraumatic, Normocephalic  EYES: EOMI,   HEART: S1S2+  CHEST/LUNG: Clear to auscultation bilaterally;   ABDOMEN: Soft, Nontender, Nondistended; Bowel sounds present  EXTREMITIES:  Right knee with honey comb dressing  motor UE 5/5, LLE 5/5, right HF, KE, 4/5, ankle 5/5       FUNCTIONAL EXAM: min assist with transfers and walking with walker    LABS:                        11.4   10.3  )-----------( 278      ( 2018 06:03 )             36.6     06-21    138  |  99  |  6.0<L>  ----------------------------<  105  4.4   |  27.0  |  0.69    Ca    9.1      2018 06:03  Phos  3.0     06-20  Mg     1.5     06-20    RADIOLOGY & ADDITIONAL STUDIES:    A/P:    67 year old female with medical comorbidities as listed above, Knee OA, now s/o right TKA  Bedside therapy limited due to lethargy/sleepiness  Needs constant verbal cues to stay awake. Recommend titrating pain meds as may be contributing to current mental status    Continue bedside therapy per TKA protocol. Recommend subacute rehab to improve overall function as patient lives alone.   If she does well with bedside PT in am, may consider dc home.   d/w SW    2. Mag low- replete per Primary team. d/w ortho.     Thank you

## 2018-06-21 NOTE — PROGRESS NOTE ADULT - SUBJECTIVE AND OBJECTIVE BOX
Patient seen and examined . S/p R TKA   , POD # 2. Pain well controlled. 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  Risk factors for obstructive sleep apnea  Alcoholism  UTI (urinary tract infection): frequent, last treated on 12/10/13 X 10 days with Bactrim, last cultured on  which was negative  Meniscus tear: left knee  Osteoarthritis  MVP (mitral valve prolapse)  Lactose intolerance  Fibromyalgia  Sjoegren syndrome  Lupus: discoid type  Chronic urticaria  Diverticulitis  Anxiety  H/O allergy: environmental, animal  GERD (gastroesophageal reflux disease)  IBS (irritable bowel syndrome)  Hypothyroidism: h/o Hashimoto Thyroiditis  HTN (hypertension)  Prediabetes  Asthma: well controlled    History of total left knee replacement  S/P lumpectomy of breast: right breast -benign  13  S/P colon resection: 13  Trauma: s/p multiple repair of uterine trauma secondary to an accident as a child with a broom  S/P D&C  Obesity  S/P cholecystectomy:   H/O: hysterectomy: LOUISE, BSO    S/P appendectomy:   H/O ovarian cystectomy      MEDICATIONS  (STANDING):  acetaminophen   Tablet. 975 milliGRAM(s) Oral every 8 hours  amLODIPine   Tablet 5 milliGRAM(s) Oral daily  clonazePAM Tablet 0.5 milliGRAM(s) Oral two times a day  docusate sodium 100 milliGRAM(s) Oral three times a day  enoxaparin Injectable 30 milliGRAM(s) SubCutaneous every 12 hours  folic acid 1 milliGRAM(s) Oral daily  hydrOXYzine hydrochloride 50 milliGRAM(s) Oral three times a day  lactated ringers. 1000 milliLiter(s) (125 mL/Hr) IV Continuous <Continuous>  levothyroxine 137 MICROGram(s) Oral daily  levothyroxine 150 MICROGram(s) Oral daily  lisinopril 40 milliGRAM(s) Oral daily  magnesium sulfate  IVPB 2 Gram(s) IV Intermittent once  multivitamin 1 Tablet(s) Oral daily  pantoprazole    Tablet 40 milliGRAM(s) Oral before breakfast  potassium chloride    Tablet ER 10 milliEquivalent(s) Oral daily  senna 2 Tablet(s) Oral at bedtime  thiamine 100 milliGRAM(s) Oral daily    MEDICATIONS  (PRN):  ALBUTerol    90 MICROgram(s) HFA Inhaler 2 Puff(s) Inhalation every 6 hours PRN Shortness of Breath and/or Wheezing  aluminum hydroxide/magnesium hydroxide/simethicone Suspension 30 milliLiter(s) Oral four times a day PRN Indigestion  artificial  tears Solution 1 Drop(s) Both EYES four times a day PRN Dry Eyes  ibuprofen  Tablet 400 milliGRAM(s) Oral every 4 hours PRN mild pain  LORazepam   Injectable 2 milliGRAM(s) IV Push every 1 hour PRN Symptom-triggered: each CIWA -Ar score 8 or GREATER  magnesium hydroxide Suspension 30 milliLiter(s) Oral daily PRN Constipation  morphine  - Injectable 4 milliGRAM(s) IV Push every 4 hours PRN breakthrough pain  ondansetron Injectable 4 milliGRAM(s) IV Push every 6 hours PRN Nausea and/or Vomiting  oxyCODONE    IR 10 milliGRAM(s) Oral every 3 hours PRN Mild Pain (1 - 3)  oxyCODONE    IR 15 milliGRAM(s) Oral every 3 hours PRN Moderate Pain (4 - 6)      LABS:                          11.4   10.3  )-----------( 278      ( 2018 06:03 )             36.6     06-21    138  |  99  |  6.0<L>  ----------------------------<  105  4.4   |  27.0  |  0.69    Ca    9.1      2018 06:03  Phos  3.0     06-20  Mg     1.5     06-20          REVIEW OF SYSTEMS:    CONSTITUTIONAL: No fever, weight loss, or fatigue , feels sleepy   EYES: No eye pain, visual disturbances, or discharge  ENMT:  No difficulty hearing, tinnitus, vertigo; No sinus or throat pain  NECK: No pain or stiffness  RESPIRATORY: No cough, wheezing, chills or hemoptysis; No shortness of breath  CARDIOVASCULAR: No chest pain, palpitations, dizziness, or leg swelling  GASTROINTESTINAL: No abdominal or epigastric pain. No nausea, vomiting, or hematemesis; No diarrhea or constipation. No melena or hematochezia.  GENITOURINARY: No dysuria, frequency, hematuria, or incontinence  NEUROLOGICAL: No headaches, memory loss, loss of strength, numbness, or tremors  SKIN: No itching, burning, rashes, or lesions   LYMPH NODES: No enlarged glands  ENDOCRINE: No heat or cold intolerance; No hair loss  MUSCULOSKELETAL: R knee pain well controlled   PSYCHIATRIC: No depression, anxiety, mood swings, or difficulty sleeping  HEME/LYMPH: No easy bruising, or bleeding gums  ALLERGY AND IMMUNOLOGIC: No hives or eczema    Vital Signs Last 24 Hrs  T(C): 37.2 (2018 07:55), Max: 37.2 (2018 07:55)  T(F): 98.9 (2018 07:55), Max: 98.9 (2018 07:55)  HR: 89 (2018 07:55) (88 - 92)  BP: 128/70 (2018 07:55) (111/68 - 138/88)  BP(mean): --  RR: 19 (2018 07:55) (18 - 20)  SpO2: 97% (2018 07:55) (93% - 100%)  PHYSICAL EXAM:    GENERAL: NAD, well-groomed, well-developed  HEAD:  Atraumatic, Normocephalic  EYES: pupils 1mm b/l sluggishly reactive    NECK: Supple, No JVD, Normal thyroid  NERVOUS SYSTEM:  Alert & Oriented X3, no focal deficit, mild b/l tremor +   CHEST/LUNG: CTA b/l ,  no  rales, rhonchi, wheezing, or rubs  HEART: Regular rate and rhythm; No murmurs, rubs, or gallops  ABDOMEN: Soft, Nontender, Nondistended; Bowel sounds present  EXTREMITIES:  2+ Peripheral Pulses, No clubbing, cyanosis, or edema , R knee dressing + , clean and dry   LYMPH: No lymphadenopathy noted  SKIN: No rashes or lesions

## 2018-06-21 NOTE — PROGRESS NOTE ADULT - ASSESSMENT
This is a 67 y.o female with  two year history of right knee pain for which she has undergone injections for in the past( cortisone and gel ) , was taking Aleve with some relief . Pain got worst lately .  She is now s/p R TKA , POD # 1     Problem/Recommendation - 1:  Problem: Unilateral primary osteoarthritis, right knee. Recommendation: S/P R TKA .  PT/OT/pain mgmt  DVT prophylaxis- as per ortho  Abx as per SCIP  Incentive spirometry  Prophylaxis of opioid  induced constipation.     Problem/Recommendation - 2:  ·  Problem: Essential hypertension.  Recommendation: continue home meds with parameters. Presently patient with postop hypotension - will continue ivf .      Problem/Recommendation - 3:  ·  Problem: Acquired hypothyroidism.  Recommendation: continue Synthroid.      Problem/Recommendation - 4:  ·  Problem: Gastroesophageal reflux disease, esophagitis presence not specified.  Recommendation: continue PPI.      Problem/Recommendation - 5:  ·  Problem: Prediabetes.  Recommendation: Diet - follow up with PMD for further mgmt.      Problem/Recommendation - 6:  Problem: Anxiety. Recommendation: continue Xanax.     Problem/Recommendation - 7:  Problem: Alcoholism. Recommendation: Patient state she was counseled to quit  , last drink was 2 wks ago , was treated with Librium for Alcohol withdrawal prophylaxis - will closely observe . Continue  CIWA protocol .      Problem/Recommendation - 8:  Problem: Fibromyalgia. Recommendation: history off.     Problem/Recommendation - 9:  Problem: Asthma. Recommendation: stable , continue Albuterol PRN.    Problem / Plan - 10: Postoperative nausea / HA / lightheadedness - likely secondary to opioids - continue Zofran PRN / IVF     Problem / Plan    Awaiting for am labs - will follow . This is a 67 y.o female with  two year history of right knee pain for which she has undergone injections for in the past( cortisone and gel ) , was taking Aleve with some relief . Pain got worst lately .  She is now s/p R TKA , POD # 1     Problem/Recommendation - 1:  Problem: Unilateral primary osteoarthritis, right knee. Recommendation: S/P R TKA .  PT/OT/pain mgmt  DVT prophylaxis- as per ortho  Abx as per SCIP  Incentive spirometry  Prophylaxis of opioid  induced constipation.     Problem/Recommendation - 2:  ·  Problem: Essential hypertension.  Recommendation: continue home meds with parameters. Presently patient with postop hypotension - will continue ivf .      Problem/Recommendation - 3:  ·  Problem: Acquired hypothyroidism.  Recommendation: continue Synthroid.      Problem/Recommendation - 4:  ·  Problem: Gastroesophageal reflux disease, esophagitis presence not specified.  Recommendation: continue PPI.      Problem/Recommendation - 5:  ·  Problem: Prediabetes.  Recommendation: Diet - follow up with PMD for further mgmt.      Problem/Recommendation - 6:  Problem: Anxiety. Recommendation: continue Xanax.     Problem/Recommendation - 7:  Problem: Alcoholism. Recommendation: Patient state she was counseled to quit  , last drink was 2 wks ago , was treated with Librium for Alcohol withdrawal prophylaxis - will closely observe . Continue  CIWA protocol .      Problem/Recommendation - 8:  Problem: Fibromyalgia. Recommendation: history off.     Problem/Recommendation - 9:  Problem: Asthma. Recommendation: stable , continue Albuterol PRN.    Problem / Plan - 10: Postoperative nausea / HA / lightheadedness - resolved    Problem / Plan- 11: Lethargy - likely secondary to opioids . D/w ortho team will d/c Oxycontin , will decrease Clonopin and hold for sedation     Problem / Plan -12; Hypomagnesemia - will suppl and follow labs in am .      Await

## 2018-06-22 VITALS
HEART RATE: 94 BPM | DIASTOLIC BLOOD PRESSURE: 72 MMHG | OXYGEN SATURATION: 92 % | SYSTOLIC BLOOD PRESSURE: 115 MMHG | TEMPERATURE: 98 F

## 2018-06-22 LAB
ANION GAP SERPL CALC-SCNC: 12 MMOL/L — SIGNIFICANT CHANGE UP (ref 5–17)
BUN SERPL-MCNC: 6 MG/DL — LOW (ref 8–20)
CALCIUM SERPL-MCNC: 9.2 MG/DL — SIGNIFICANT CHANGE UP (ref 8.6–10.2)
CHLORIDE SERPL-SCNC: 100 MMOL/L — SIGNIFICANT CHANGE UP (ref 98–107)
CO2 SERPL-SCNC: 28 MMOL/L — SIGNIFICANT CHANGE UP (ref 22–29)
CREAT SERPL-MCNC: 0.64 MG/DL — SIGNIFICANT CHANGE UP (ref 0.5–1.3)
GLUCOSE SERPL-MCNC: 91 MG/DL — SIGNIFICANT CHANGE UP (ref 70–115)
HCT VFR BLD CALC: 33 % — LOW (ref 37–47)
HGB BLD-MCNC: 10.5 G/DL — LOW (ref 12–16)
MAGNESIUM SERPL-MCNC: 2 MG/DL — SIGNIFICANT CHANGE UP (ref 1.6–2.6)
MCHC RBC-ENTMCNC: 29.4 PG — SIGNIFICANT CHANGE UP (ref 27–31)
MCHC RBC-ENTMCNC: 31.8 G/DL — LOW (ref 32–36)
MCV RBC AUTO: 92.4 FL — SIGNIFICANT CHANGE UP (ref 81–99)
PLATELET # BLD AUTO: 257 K/UL — SIGNIFICANT CHANGE UP (ref 150–400)
POTASSIUM SERPL-MCNC: 4.5 MMOL/L — SIGNIFICANT CHANGE UP (ref 3.5–5.3)
POTASSIUM SERPL-SCNC: 4.5 MMOL/L — SIGNIFICANT CHANGE UP (ref 3.5–5.3)
RBC # BLD: 3.57 M/UL — LOW (ref 4.4–5.2)
RBC # FLD: 16.2 % — HIGH (ref 11–15.6)
SODIUM SERPL-SCNC: 140 MMOL/L — SIGNIFICANT CHANGE UP (ref 135–145)
WBC # BLD: 7.4 K/UL — SIGNIFICANT CHANGE UP (ref 4.8–10.8)
WBC # FLD AUTO: 7.4 K/UL — SIGNIFICANT CHANGE UP (ref 4.8–10.8)

## 2018-06-22 PROCEDURE — 97163 PT EVAL HIGH COMPLEX 45 MIN: CPT

## 2018-06-22 PROCEDURE — 99233 SBSQ HOSP IP/OBS HIGH 50: CPT

## 2018-06-22 PROCEDURE — 82962 GLUCOSE BLOOD TEST: CPT

## 2018-06-22 PROCEDURE — 86901 BLOOD TYPING SEROLOGIC RH(D): CPT

## 2018-06-22 PROCEDURE — 80048 BASIC METABOLIC PNL TOTAL CA: CPT

## 2018-06-22 PROCEDURE — 73560 X-RAY EXAM OF KNEE 1 OR 2: CPT

## 2018-06-22 PROCEDURE — 97116 GAIT TRAINING THERAPY: CPT

## 2018-06-22 PROCEDURE — 84100 ASSAY OF PHOSPHORUS: CPT

## 2018-06-22 PROCEDURE — 86900 BLOOD TYPING SEROLOGIC ABO: CPT

## 2018-06-22 PROCEDURE — 97167 OT EVAL HIGH COMPLEX 60 MIN: CPT

## 2018-06-22 PROCEDURE — 85027 COMPLETE CBC AUTOMATED: CPT

## 2018-06-22 PROCEDURE — 99232 SBSQ HOSP IP/OBS MODERATE 35: CPT

## 2018-06-22 PROCEDURE — 83735 ASSAY OF MAGNESIUM: CPT

## 2018-06-22 PROCEDURE — 88305 TISSUE EXAM BY PATHOLOGIST: CPT

## 2018-06-22 PROCEDURE — 97110 THERAPEUTIC EXERCISES: CPT

## 2018-06-22 PROCEDURE — 86850 RBC ANTIBODY SCREEN: CPT

## 2018-06-22 PROCEDURE — 97530 THERAPEUTIC ACTIVITIES: CPT

## 2018-06-22 PROCEDURE — C1776: CPT

## 2018-06-22 PROCEDURE — C1713: CPT

## 2018-06-22 PROCEDURE — 88311 DECALCIFY TISSUE: CPT

## 2018-06-22 PROCEDURE — 36415 COLL VENOUS BLD VENIPUNCTURE: CPT

## 2018-06-22 RX ORDER — LEVOTHYROXINE SODIUM 125 MCG
0 TABLET ORAL
Qty: 0 | Refills: 0 | COMMUNITY

## 2018-06-22 RX ORDER — ENOXAPARIN SODIUM 100 MG/ML
30 INJECTION SUBCUTANEOUS
Qty: 26 | Refills: 0 | OUTPATIENT
Start: 2018-06-22 | End: 2018-07-04

## 2018-06-22 RX ORDER — SENNOSIDES/DOCUSATE SODIUM 8.6MG-50MG
2 TABLET ORAL
Qty: 40 | Refills: 0 | OUTPATIENT
Start: 2018-06-22 | End: 2018-07-01

## 2018-06-22 RX ORDER — PANTOPRAZOLE SODIUM 20 MG/1
1 TABLET, DELAYED RELEASE ORAL
Qty: 0 | Refills: 0 | COMMUNITY

## 2018-06-22 RX ORDER — IBUPROFEN 200 MG
1 TABLET ORAL
Qty: 0 | Refills: 0 | COMMUNITY
Start: 2018-06-22

## 2018-06-22 RX ORDER — OXYCODONE HYDROCHLORIDE 5 MG/1
1 TABLET ORAL
Qty: 42 | Refills: 0 | OUTPATIENT
Start: 2018-06-22 | End: 2018-06-28

## 2018-06-22 RX ORDER — LEVALBUTEROL 1.25 MG/.5ML
2 SOLUTION, CONCENTRATE RESPIRATORY (INHALATION)
Qty: 0 | Refills: 0 | COMMUNITY

## 2018-06-22 RX ORDER — LISINOPRIL 2.5 MG/1
0 TABLET ORAL
Qty: 0 | Refills: 0 | COMMUNITY

## 2018-06-22 RX ADMIN — Medication 10 MILLIEQUIVALENT(S): at 11:26

## 2018-06-22 RX ADMIN — ENOXAPARIN SODIUM 30 MILLIGRAM(S): 100 INJECTION SUBCUTANEOUS at 05:45

## 2018-06-22 RX ADMIN — MORPHINE SULFATE 4 MILLIGRAM(S): 50 CAPSULE, EXTENDED RELEASE ORAL at 00:57

## 2018-06-22 RX ADMIN — Medication 1 TABLET(S): at 11:26

## 2018-06-22 RX ADMIN — Medication 100 MILLIGRAM(S): at 11:26

## 2018-06-22 RX ADMIN — Medication 150 MICROGRAM(S): at 05:34

## 2018-06-22 RX ADMIN — Medication 975 MILLIGRAM(S): at 05:55

## 2018-06-22 RX ADMIN — Medication 50 MILLIGRAM(S): at 05:57

## 2018-06-22 RX ADMIN — PANTOPRAZOLE SODIUM 40 MILLIGRAM(S): 20 TABLET, DELAYED RELEASE ORAL at 05:57

## 2018-06-22 RX ADMIN — Medication 1 MILLIGRAM(S): at 11:26

## 2018-06-22 RX ADMIN — Medication 975 MILLIGRAM(S): at 14:00

## 2018-06-22 RX ADMIN — Medication 100 MILLIGRAM(S): at 05:57

## 2018-06-22 RX ADMIN — Medication 50 MILLIGRAM(S): at 13:05

## 2018-06-22 RX ADMIN — MORPHINE SULFATE 4 MILLIGRAM(S): 50 CAPSULE, EXTENDED RELEASE ORAL at 01:12

## 2018-06-22 RX ADMIN — Medication 0.5 MILLIGRAM(S): at 05:57

## 2018-06-22 RX ADMIN — Medication 100 MILLIGRAM(S): at 13:05

## 2018-06-22 RX ADMIN — Medication 137 MICROGRAM(S): at 05:56

## 2018-06-22 RX ADMIN — Medication 975 MILLIGRAM(S): at 13:06

## 2018-06-22 RX ADMIN — Medication 975 MILLIGRAM(S): at 06:30

## 2018-06-22 NOTE — PROGRESS NOTE ADULT - SUBJECTIVE AND OBJECTIVE BOX
Patient seen and examined . S/p R TKA  , POD # 3    CC : R knee pain well controlled         MEDICATIONS  (STANDING):  acetaminophen   Tablet. 975 milliGRAM(s) Oral every 8 hours  amLODIPine   Tablet 5 milliGRAM(s) Oral daily  clonazePAM Tablet 0.5 milliGRAM(s) Oral two times a day  docusate sodium 100 milliGRAM(s) Oral three times a day  enoxaparin Injectable 30 milliGRAM(s) SubCutaneous every 12 hours  folic acid 1 milliGRAM(s) Oral daily  hydrOXYzine hydrochloride 50 milliGRAM(s) Oral three times a day  lactated ringers. 1000 milliLiter(s) (125 mL/Hr) IV Continuous <Continuous>  levothyroxine 137 MICROGram(s) Oral daily  levothyroxine 150 MICROGram(s) Oral daily  lisinopril 40 milliGRAM(s) Oral daily  multivitamin 1 Tablet(s) Oral daily  pantoprazole    Tablet 40 milliGRAM(s) Oral before breakfast  potassium chloride    Tablet ER 10 milliEquivalent(s) Oral daily  senna 2 Tablet(s) Oral at bedtime  thiamine 100 milliGRAM(s) Oral daily    MEDICATIONS  (PRN):  ALBUTerol    90 MICROgram(s) HFA Inhaler 2 Puff(s) Inhalation every 6 hours PRN Shortness of Breath and/or Wheezing  aluminum hydroxide/magnesium hydroxide/simethicone Suspension 30 milliLiter(s) Oral four times a day PRN Indigestion  artificial  tears Solution 1 Drop(s) Both EYES four times a day PRN Dry Eyes  ibuprofen  Tablet 400 milliGRAM(s) Oral every 4 hours PRN mild pain  LORazepam   Injectable 2 milliGRAM(s) IV Push every 1 hour PRN Symptom-triggered: each CIWA -Ar score 8 or GREATER  magnesium hydroxide Suspension 30 milliLiter(s) Oral daily PRN Constipation  morphine  - Injectable 4 milliGRAM(s) IV Push every 4 hours PRN breakthrough pain  ondansetron Injectable 4 milliGRAM(s) IV Push every 6 hours PRN Nausea and/or Vomiting  oxyCODONE    IR 10 milliGRAM(s) Oral every 3 hours PRN Mild Pain (1 - 3)  oxyCODONE    IR 15 milliGRAM(s) Oral every 3 hours PRN Moderate Pain (4 - 6)      LABS:                          10.5   7.4   )-----------( 257      ( 22 Jun 2018 06:20 )             33.0     06-22    140  |  100  |  6.0<L>  ----------------------------<  91  4.5   |  28.0  |  0.64    Ca    9.2      22 Jun 2018 06:20  Phos  3.0     06-20  Mg     2.0     06-22      REVIEW OF SYSTEMS:    CONSTITUTIONAL: No fever, weight loss, or fatigue  EYES: No eye pain, visual disturbances, or discharge  ENMT:  No difficulty hearing, tinnitus, vertigo; No sinus or throat pain  NECK: No pain or stiffness  RESPIRATORY: No cough, wheezing, chills or hemoptysis; No shortness of breath  CARDIOVASCULAR: No chest pain, palpitations, dizziness, or leg swelling  GASTROINTESTINAL: No abdominal or epigastric pain. No nausea, vomiting, or hematemesis; No diarrhea or constipation. No melena or hematochezia.  GENITOURINARY: No dysuria, frequency, hematuria, or incontinence  NEUROLOGICAL: No headaches, memory loss, loss of strength, numbness, or tremors  SKIN: No itching, burning, rashes, or lesions   LYMPH NODES: No enlarged glands  ENDOCRINE: No heat or cold intolerance; No hair loss  MUSCULOSKELETAL: R knee pain well controlled   PSYCHIATRIC: No depression, anxiety, mood swings, or difficulty sleeping  HEME/LYMPH: No easy bruising, or bleeding gums  ALLERGY AND IMMUNOLOGIC: No hives or eczema    Vital Signs Last 24 Hrs  T(C): 36.9 (22 Jun 2018 08:18), Max: 36.9 (21 Jun 2018 16:15)  T(F): 98.4 (22 Jun 2018 08:18), Max: 98.5 (21 Jun 2018 16:15)  HR: 94 (22 Jun 2018 08:18) (82 - 107)  BP: 115/72 (22 Jun 2018 08:18) (101/66 - 124/76)  BP(mean): --  RR: 18 (22 Jun 2018 04:40) (18 - 18)  SpO2: 92% (22 Jun 2018 08:18) (92% - 98%)  PHYSICAL EXAM:    GENERAL: NAD, well-groomed, well-developed  HEAD:  Atraumatic, Normocephalic  EYES: EOMI, PERRLA, conjunctiva and sclera clear  NECK: Supple, No JVD, Normal thyroid  NERVOUS SYSTEM:  Alert & Oriented X3, no focal deficit  CHEST/LUNG: CTA b/l ,  no  rales, rhonchi, wheezing, or rubs  HEART: Regular rate and rhythm; No murmurs, rubs, or gallops  ABDOMEN: Soft, Nontender, Nondistended; Bowel sounds present  EXTREMITIES:  2+ Peripheral Pulses, No clubbing, cyanosis, or edema , R knee dressing + , clean and dry   LYMPH: No lymphadenopathy noted  SKIN: No rashes or lesions Patient seen and examined . S/p R TKA  , POD # 3. Doing well AAOX 3 ,     CC : R knee pain well controlled         MEDICATIONS  (STANDING):  acetaminophen   Tablet. 975 milliGRAM(s) Oral every 8 hours  amLODIPine   Tablet 5 milliGRAM(s) Oral daily  clonazePAM Tablet 0.5 milliGRAM(s) Oral two times a day  docusate sodium 100 milliGRAM(s) Oral three times a day  enoxaparin Injectable 30 milliGRAM(s) SubCutaneous every 12 hours  folic acid 1 milliGRAM(s) Oral daily  hydrOXYzine hydrochloride 50 milliGRAM(s) Oral three times a day  lactated ringers. 1000 milliLiter(s) (125 mL/Hr) IV Continuous <Continuous>  levothyroxine 137 MICROGram(s) Oral daily  levothyroxine 150 MICROGram(s) Oral daily  lisinopril 40 milliGRAM(s) Oral daily  multivitamin 1 Tablet(s) Oral daily  pantoprazole    Tablet 40 milliGRAM(s) Oral before breakfast  potassium chloride    Tablet ER 10 milliEquivalent(s) Oral daily  senna 2 Tablet(s) Oral at bedtime  thiamine 100 milliGRAM(s) Oral daily    MEDICATIONS  (PRN):  ALBUTerol    90 MICROgram(s) HFA Inhaler 2 Puff(s) Inhalation every 6 hours PRN Shortness of Breath and/or Wheezing  aluminum hydroxide/magnesium hydroxide/simethicone Suspension 30 milliLiter(s) Oral four times a day PRN Indigestion  artificial  tears Solution 1 Drop(s) Both EYES four times a day PRN Dry Eyes  ibuprofen  Tablet 400 milliGRAM(s) Oral every 4 hours PRN mild pain  LORazepam   Injectable 2 milliGRAM(s) IV Push every 1 hour PRN Symptom-triggered: each CIWA -Ar score 8 or GREATER  magnesium hydroxide Suspension 30 milliLiter(s) Oral daily PRN Constipation  morphine  - Injectable 4 milliGRAM(s) IV Push every 4 hours PRN breakthrough pain  ondansetron Injectable 4 milliGRAM(s) IV Push every 6 hours PRN Nausea and/or Vomiting  oxyCODONE    IR 10 milliGRAM(s) Oral every 3 hours PRN Mild Pain (1 - 3)  oxyCODONE    IR 15 milliGRAM(s) Oral every 3 hours PRN Moderate Pain (4 - 6)      LABS:                          10.5   7.4   )-----------( 257      ( 22 Jun 2018 06:20 )             33.0     06-22    140  |  100  |  6.0<L>  ----------------------------<  91  4.5   |  28.0  |  0.64    Ca    9.2      22 Jun 2018 06:20  Phos  3.0     06-20  Mg     2.0     06-22      REVIEW OF SYSTEMS:    CONSTITUTIONAL: No fever, weight loss, or fatigue  EYES: No eye pain, visual disturbances, or discharge  ENMT:  No difficulty hearing, tinnitus, vertigo; No sinus or throat pain  NECK: No pain or stiffness  RESPIRATORY: No cough, wheezing, chills or hemoptysis; No shortness of breath  CARDIOVASCULAR: No chest pain, palpitations, dizziness, or leg swelling  GASTROINTESTINAL: No abdominal or epigastric pain. No nausea, vomiting, or hematemesis; No diarrhea or constipation. No melena or hematochezia.  GENITOURINARY: No dysuria, frequency, hematuria, or incontinence  NEUROLOGICAL: No headaches, memory loss, loss of strength, numbness, or tremors  SKIN: No itching, burning, rashes, or lesions   LYMPH NODES: No enlarged glands  ENDOCRINE: No heat or cold intolerance; No hair loss  MUSCULOSKELETAL: R knee pain well controlled   PSYCHIATRIC: No depression, anxiety, mood swings, or difficulty sleeping  HEME/LYMPH: No easy bruising, or bleeding gums  ALLERGY AND IMMUNOLOGIC: No hives or eczema    Vital Signs Last 24 Hrs  T(C): 36.9 (22 Jun 2018 08:18), Max: 36.9 (21 Jun 2018 16:15)  T(F): 98.4 (22 Jun 2018 08:18), Max: 98.5 (21 Jun 2018 16:15)  HR: 94 (22 Jun 2018 08:18) (82 - 107)  BP: 115/72 (22 Jun 2018 08:18) (101/66 - 124/76)  BP(mean): --  RR: 18 (22 Jun 2018 04:40) (18 - 18)  SpO2: 92% (22 Jun 2018 08:18) (92% - 98%)  PHYSICAL EXAM:    GENERAL: NAD, well-groomed, well-developed  HEAD:  Atraumatic, Normocephalic  EYES: EOMI, PERRLA, conjunctiva and sclera clear  NECK: Supple, No JVD, Normal thyroid  NERVOUS SYSTEM:  Alert & Oriented X3, no focal deficit  CHEST/LUNG: CTA b/l ,  no  rales, rhonchi, wheezing, or rubs  HEART: Regular rate and rhythm; No murmurs, rubs, or gallops  ABDOMEN: Soft, Nontender, Nondistended; Bowel sounds present  EXTREMITIES:  2+ Peripheral Pulses, No clubbing, cyanosis, or edema , R knee dressing + , clean and dry   LYMPH: No lymphadenopathy noted  SKIN: No rashes or lesions Patient seen and examined . S/p R TKA  , POD # 3. Doing well AAOX 3 , not lethargic , pain well controlled , no other complaints .    CC : R knee pain well controlled , lethargy resolved     MEDICATIONS  (STANDING):  acetaminophen   Tablet. 975 milliGRAM(s) Oral every 8 hours  amLODIPine   Tablet 5 milliGRAM(s) Oral daily  clonazePAM Tablet 0.5 milliGRAM(s) Oral two times a day  docusate sodium 100 milliGRAM(s) Oral three times a day  enoxaparin Injectable 30 milliGRAM(s) SubCutaneous every 12 hours  folic acid 1 milliGRAM(s) Oral daily  hydrOXYzine hydrochloride 50 milliGRAM(s) Oral three times a day  lactated ringers. 1000 milliLiter(s) (125 mL/Hr) IV Continuous <Continuous>  levothyroxine 137 MICROGram(s) Oral daily  levothyroxine 150 MICROGram(s) Oral daily  lisinopril 40 milliGRAM(s) Oral daily  multivitamin 1 Tablet(s) Oral daily  pantoprazole    Tablet 40 milliGRAM(s) Oral before breakfast  potassium chloride    Tablet ER 10 milliEquivalent(s) Oral daily  senna 2 Tablet(s) Oral at bedtime  thiamine 100 milliGRAM(s) Oral daily    MEDICATIONS  (PRN):  ALBUTerol    90 MICROgram(s) HFA Inhaler 2 Puff(s) Inhalation every 6 hours PRN Shortness of Breath and/or Wheezing  aluminum hydroxide/magnesium hydroxide/simethicone Suspension 30 milliLiter(s) Oral four times a day PRN Indigestion  artificial  tears Solution 1 Drop(s) Both EYES four times a day PRN Dry Eyes  ibuprofen  Tablet 400 milliGRAM(s) Oral every 4 hours PRN mild pain  LORazepam   Injectable 2 milliGRAM(s) IV Push every 1 hour PRN Symptom-triggered: each CIWA -Ar score 8 or GREATER  magnesium hydroxide Suspension 30 milliLiter(s) Oral daily PRN Constipation  morphine  - Injectable 4 milliGRAM(s) IV Push every 4 hours PRN breakthrough pain  ondansetron Injectable 4 milliGRAM(s) IV Push every 6 hours PRN Nausea and/or Vomiting  oxyCODONE    IR 10 milliGRAM(s) Oral every 3 hours PRN Mild Pain (1 - 3)  oxyCODONE    IR 15 milliGRAM(s) Oral every 3 hours PRN Moderate Pain (4 - 6)      LABS:                          10.5   7.4   )-----------( 257      ( 22 Jun 2018 06:20 )             33.0     06-22    140  |  100  |  6.0<L>  ----------------------------<  91  4.5   |  28.0  |  0.64    Ca    9.2      22 Jun 2018 06:20  Phos  3.0     06-20  Mg     2.0     06-22      REVIEW OF SYSTEMS:    CONSTITUTIONAL: No fever, weight loss, or fatigue  EYES: No eye pain, visual disturbances, or discharge  ENMT:  No difficulty hearing, tinnitus, vertigo; No sinus or throat pain  NECK: No pain or stiffness  RESPIRATORY: No cough, wheezing, chills or hemoptysis; No shortness of breath  CARDIOVASCULAR: No chest pain, palpitations, dizziness, or leg swelling  GASTROINTESTINAL: No abdominal or epigastric pain. No nausea, vomiting, or hematemesis; No diarrhea or constipation. No melena or hematochezia.  GENITOURINARY: No dysuria, frequency, hematuria, or incontinence  NEUROLOGICAL: No headaches, memory loss, loss of strength, numbness, or tremors  SKIN: No itching, burning, rashes, or lesions   LYMPH NODES: No enlarged glands  ENDOCRINE: No heat or cold intolerance; No hair loss  MUSCULOSKELETAL: R knee pain well controlled   PSYCHIATRIC: No depression, anxiety, mood swings, or difficulty sleeping  HEME/LYMPH: No easy bruising, or bleeding gums  ALLERGY AND IMMUNOLOGIC: No hives or eczema    Vital Signs Last 24 Hrs  T(C): 36.9 (22 Jun 2018 08:18), Max: 36.9 (21 Jun 2018 16:15)  T(F): 98.4 (22 Jun 2018 08:18), Max: 98.5 (21 Jun 2018 16:15)  HR: 94 (22 Jun 2018 08:18) (82 - 107)  BP: 115/72 (22 Jun 2018 08:18) (101/66 - 124/76)  BP(mean): --  RR: 18 (22 Jun 2018 04:40) (18 - 18)  SpO2: 92% (22 Jun 2018 08:18) (92% - 98%)  PHYSICAL EXAM:    GENERAL: NAD, well-groomed, well-developed  HEAD:  Atraumatic, Normocephalic  EYES: EOMI, PERRLA, conjunctiva and sclera clear  NECK: Supple, No JVD, Normal thyroid  NERVOUS SYSTEM:  Alert & Oriented X3, no focal deficit  CHEST/LUNG: CTA b/l ,  no  rales, rhonchi, wheezing, or rubs  HEART: Regular rate and rhythm; No murmurs, rubs, or gallops  ABDOMEN: Soft, Nontender, Nondistended; Bowel sounds present  EXTREMITIES:  2+ Peripheral Pulses, No clubbing, cyanosis, or edema , R knee dressing + , clean and dry   LYMPH: No lymphadenopathy noted  SKIN: No rashes or lesions

## 2018-06-22 NOTE — PROGRESS NOTE ADULT - SUBJECTIVE AND OBJECTIVE BOX
Patient seen and examined at bedside. comfortable in bed, c/o mild pain at operative site. Denies fever/chills, SOB/chest pain, abdominal pain, numbness/tingling. no complaints.    Vital Signs Last 24 Hrs  T(C): 36.7 (22 Jun 2018 04:40), Max: 37.2 (21 Jun 2018 07:55)  T(F): 98 (22 Jun 2018 04:40), Max: 98.9 (21 Jun 2018 07:55)  HR: 82 (22 Jun 2018 04:40) (82 - 107)  BP: 107/66 (22 Jun 2018 04:40) (101/66 - 128/70)  BP(mean): --  RR: 18 (22 Jun 2018 04:40) (18 - 19)  SpO2: 96% (22 Jun 2018 04:40) (93% - 98%)    RLE: Dressing C/D/I. + dorsi/plantarflexion. DP 2+. SILT. Calf soft NT B/L.                          11.4   10.3  )-----------( 278      ( 21 Jun 2018 06:03 )             36.6     06-22    140  |  100  |  6.0<L>  ----------------------------<  91  4.5   |  28.0  |  0.64    Ca    9.2      22 Jun 2018 06:20  Phos  3.0     06-20  Mg     2.0     06-22    A/P: 67 y.o F s/p right TKA POD #3  - WBAT  - DVTP  - d/c planning - home vs [facility]OT

## 2018-06-22 NOTE — PROGRESS NOTE ADULT - ASSESSMENT
This is a 67 y.o female with  two year history of right knee pain for which she has undergone injections for in the past( cortisone and gel ) , was taking Aleve with some relief . Pain got worst lately .  She is now s/p R TKA , POD # 3     Problem/Recommendation - 1:  Problem: Unilateral primary osteoarthritis, right knee. Recommendation: S/P R TKA .  PT/OT/pain mgmt  DVT prophylaxis- as per ortho  Abx as per SCIP  Incentive spirometry  Prophylaxis of opioid  induced constipation.     Problem/Recommendation - 2:  ·  Problem: Essential hypertension.  Recommendation: continue home meds with parameters. BP well controlled      Problem/Recommendation - 3:  ·  Problem: Acquired hypothyroidism.  Recommendation: continue Synthroid.      Problem/Recommendation - 4:  ·  Problem: Gastroesophageal reflux disease, esophagitis presence not specified.  Recommendation: continue PPI.      Problem/Recommendation - 5:  ·  Problem: Prediabetes.  Recommendation: Diet - follow up with PMD for further mgmt.      Problem/Recommendation - 6:  Problem: Anxiety. Recommendation: continue Xanax.     Problem/Recommendation - 7:  Problem: Alcoholism. Recommendation: Patient state she was counseled to quit  , last drink was 2 wks ago , was treated with Librium for Alcohol withdrawal prophylaxis - will closely observe . Continue  CIWA protocol .      Problem/Recommendation - 8:  Problem: Fibromyalgia. Recommendation: history off.     Problem/Recommendation - 9:  Problem: Asthma. Recommendation: stable , continue Albuterol PRN.    Problem / Plan - 10: Postoperative nausea / HA / lightheadedness - resolved    Problem / Plan- 11: Lethargy - likely secondary to opioids . D/w ortho team will d/c Oxycontin , will decrease Clonopin and hold for sedation     Problem / Plan -12; Hypomagnesemia - supplemented This is a 67 y.o female with  two year history of right knee pain for which she has undergone injections for in the past( cortisone and gel ) , was taking Aleve with some relief . Pain got worst lately .  She is now s/p R TKA , POD # 3     Problem/Recommendation - 1:  Problem: Unilateral primary osteoarthritis, right knee. Recommendation: S/P R TKA .  PT/OT/pain mgmt  DVT prophylaxis- as per ortho  Abx as per SCIP  Incentive spirometry  Prophylaxis of opioid  induced constipation.     Problem/Recommendation - 2:  ·  Problem: Essential hypertension.  Recommendation: continue home meds with parameters. BP well controlled      Problem/Recommendation - 3:  ·  Problem: Acquired hypothyroidism.  Recommendation: continue Synthroid.      Problem/Recommendation - 4:  ·  Problem: Gastroesophageal reflux disease, esophagitis presence not specified.  Recommendation: continue PPI.      Problem/Recommendation - 5:  ·  Problem: Prediabetes.  Recommendation: Diet - follow up with PMD for further mgmt.      Problem/Recommendation - 6:  Problem: Anxiety. Recommendation: continue Xanax.     Problem/Recommendation - 7:  Problem: Alcoholism. Recommendation: Patient state she was counseled to quit  , last drink was 2 wks ago prior OR  , was treated with Librium for Alcohol withdrawal prophylaxis - will closely observe . On CIWA protocol      Problem/Recommendation - 8:  Problem: Fibromyalgia. Recommendation: history off.     Problem/Recommendation - 9:  Problem: Asthma. Recommendation: stable , continue Albuterol PRN.    Problem / Plan - 10: Postoperative nausea / HA / lightheadedness - resolved    Problem / Plan- 11: Lethargy - likely secondary to opioids . Resolved with decreasing dose of opioids and Clonopin. Patient advised to stay on small dose of Clonopin 0.5 mg BID PRN while on opioids and to increase to prior dose after opioids finished      Problem / Plan -12; Hypomagnesemia - supplemented

## 2018-06-22 NOTE — PROGRESS NOTE ADULT - SUBJECTIVE AND OBJECTIVE BOX
INTERVAL SUBJECTIVE & REVIEW OF SYMPTOMS:Chart reviewed. Pain meds have been reduced. Patient more awake and alert today. Pain controlled        MEDICATIONS:  acetaminophen   Tablet. 975 milliGRAM(s) Oral every 8 hours  ALBUTerol    90 MICROgram(s) HFA Inhaler 2 Puff(s) Inhalation every 6 hours PRN  aluminum hydroxide/magnesium hydroxide/simethicone Suspension 30 milliLiter(s) Oral four times a day PRN  amLODIPine   Tablet 5 milliGRAM(s) Oral daily  artificial  tears Solution 1 Drop(s) Both EYES four times a day PRN  clonazePAM Tablet 0.5 milliGRAM(s) Oral two times a day  docusate sodium 100 milliGRAM(s) Oral three times a day  enoxaparin Injectable 30 milliGRAM(s) SubCutaneous every 12 hours  folic acid 1 milliGRAM(s) Oral daily  hydrOXYzine hydrochloride 50 milliGRAM(s) Oral three times a day  ibuprofen  Tablet 400 milliGRAM(s) Oral every 4 hours PRN  lactated ringers. 1000 milliLiter(s) IV Continuous <Continuous>  levothyroxine 137 MICROGram(s) Oral daily  levothyroxine 150 MICROGram(s) Oral daily  lisinopril 40 milliGRAM(s) Oral daily  LORazepam   Injectable 2 milliGRAM(s) IV Push every 1 hour PRN  magnesium hydroxide Suspension 30 milliLiter(s) Oral daily PRN  morphine  - Injectable 4 milliGRAM(s) IV Push every 4 hours PRN  multivitamin 1 Tablet(s) Oral daily  ondansetron Injectable 4 milliGRAM(s) IV Push every 6 hours PRN  oxyCODONE    IR 10 milliGRAM(s) Oral every 3 hours PRN  oxyCODONE    IR 15 milliGRAM(s) Oral every 3 hours PRN  pantoprazole    Tablet 40 milliGRAM(s) Oral before breakfast  potassium chloride    Tablet ER 10 milliEquivalent(s) Oral daily  senna 2 Tablet(s) Oral at bedtime      REVIEW OF SYSTEMS  Denies HA/dizziness  Denies nausea/ abdominal pain/constipation        VITAL SIGNS  Vital Signs Last 24 Hrs  T(C): 36.9 (22 Jun 2018 08:18), Max: 36.9 (21 Jun 2018 16:15)  T(F): 98.4 (22 Jun 2018 08:18), Max: 98.5 (21 Jun 2018 16:15)  HR: 94 (22 Jun 2018 08:18) (82 - 107)  BP: 115/72 (22 Jun 2018 08:18) (101/66 - 124/76)  BP(mean): --  RR: 18 (22 Jun 2018 04:40) (18 - 18)  SpO2: 92% (22 Jun 2018 08:18) (92% - 98%)    PHYSICAL EXAM  General: NAD, alert, awake, not lethargic  Cardio: S1S2+  Resp: clear  Abdomen: soft  Extrem: RLE with dressing+, Post op swelling +  Motor 5/5 , Limited KE due to recent surgery    Functional Exam:     Sit-Stand Transfer Training  Transfer Training Sit-to-Stand Transfer: independent;  weight-bearing as tolerated   rolling walker  Transfer Training Stand-to-Sit Transfer: independent;  weight-bearing as tolerated   rolling walker  Sit-to-Stand Transfer Training Transfer Safety Analysis: decreased ROM    Gait Training  Gait Training: independent;  weight-bearing as tolerated   rolling walker;  150 feet  Gait Analysis: 2-point gait   decreased step length;  decreased ROM;  150 feet;  rolling walker  Gait Number of Times:: x 1    Stair Training  Physical Assist/Nonphysical Assist: Modified I  Weight-Bearing Restrictions: weight-bearing as tolerated  Assistive Device: rolling walker  Number of Stairs: 2       RECENT LABS:                        10.5   7.4   )-----------( 257      ( 22 Jun 2018 06:20 )             33.0     06-22    140  |  100  |  6.0<L>  ----------------------------<  91  4.5   |  28.0  |  0.64    Ca    9.2      22 Jun 2018 06:20  Mg     2.0     06-22    RADIOLOGY/OTHER RESULTS:      A/P:    67 year old female with Knee OA, now s/o right TKA  Significantly improved today after medication adjustment. Did very well with bedside therapy.  Recommend discharge home with home care.   d/w CCC/SW    2. DVT prophylaxis: on Lovenox    Thank you

## 2018-06-29 LAB — SURGICAL PATHOLOGY FINAL REPORT - CH: SIGNIFICANT CHANGE UP

## 2018-07-02 ENCOUNTER — INPATIENT (INPATIENT)
Facility: HOSPITAL | Age: 68
LOS: 3 days | Discharge: HOME | DRG: 603 | End: 2018-07-06
Attending: INTERNAL MEDICINE | Admitting: HOSPITALIST
Payer: MEDICARE

## 2018-07-02 ENCOUNTER — OTHER (OUTPATIENT)
Age: 68
End: 2018-07-02

## 2018-07-02 VITALS
HEIGHT: 63 IN | SYSTOLIC BLOOD PRESSURE: 117 MMHG | TEMPERATURE: 98 F | WEIGHT: 261.91 LBS | RESPIRATION RATE: 20 BRPM | DIASTOLIC BLOOD PRESSURE: 75 MMHG | HEART RATE: 82 BPM | OXYGEN SATURATION: 95 %

## 2018-07-02 DIAGNOSIS — O03.9 COMPLETE OR UNSPECIFIED SPONTANEOUS ABORTION WITHOUT COMPLICATION: Chronic | ICD-10-CM

## 2018-07-02 DIAGNOSIS — Z96.652 PRESENCE OF LEFT ARTIFICIAL KNEE JOINT: Chronic | ICD-10-CM

## 2018-07-02 PROCEDURE — 99285 EMERGENCY DEPT VISIT HI MDM: CPT

## 2018-07-02 NOTE — ED ADULT TRIAGE NOTE - CHIEF COMPLAINT QUOTE
I had sx on june 19  my rt knee I have pain and swelling on sat my doctor started me on biaxin I had a doppler it was neg

## 2018-07-02 NOTE — DISCHARGE NOTE ADULT - DURABLE MEDICAL EQUIPMENT AGENCY
Due to patient being non-English speaking/uses sign language, an  was used for this visit. Only for face-to-face interpretation by an external agency, date and length of interpretation can be found on the scanned worksheet.     name: Krupa Palmer  Agency: [employer]  Language: Qatari   Telephone number: [phone]  Type of interpretation: Face-to-face, spoken       Pt has own

## 2018-07-03 DIAGNOSIS — L03.90 CELLULITIS, UNSPECIFIED: ICD-10-CM

## 2018-07-03 DIAGNOSIS — R73.03 PREDIABETES: ICD-10-CM

## 2018-07-03 DIAGNOSIS — K21.9 GASTRO-ESOPHAGEAL REFLUX DISEASE WITHOUT ESOPHAGITIS: ICD-10-CM

## 2018-07-03 DIAGNOSIS — J45.909 UNSPECIFIED ASTHMA, UNCOMPLICATED: ICD-10-CM

## 2018-07-03 DIAGNOSIS — L03.115 CELLULITIS OF RIGHT LOWER LIMB: ICD-10-CM

## 2018-07-03 DIAGNOSIS — E03.9 HYPOTHYROIDISM, UNSPECIFIED: ICD-10-CM

## 2018-07-03 DIAGNOSIS — F41.9 ANXIETY DISORDER, UNSPECIFIED: ICD-10-CM

## 2018-07-03 LAB
ALBUMIN SERPL ELPH-MCNC: 4 G/DL — SIGNIFICANT CHANGE UP (ref 3.3–5.2)
ALP SERPL-CCNC: 90 U/L — SIGNIFICANT CHANGE UP (ref 40–120)
ALT FLD-CCNC: 23 U/L — SIGNIFICANT CHANGE UP
ANION GAP SERPL CALC-SCNC: 17 MMOL/L — SIGNIFICANT CHANGE UP (ref 5–17)
AST SERPL-CCNC: 24 U/L — SIGNIFICANT CHANGE UP
B PERT IGG+IGM PNL SER: ABNORMAL
BASOPHILS # BLD AUTO: 0.1 K/UL — SIGNIFICANT CHANGE UP (ref 0–0.2)
BASOPHILS NFR BLD AUTO: 0.8 % — SIGNIFICANT CHANGE UP (ref 0–2)
BILIRUB SERPL-MCNC: 0.4 MG/DL — SIGNIFICANT CHANGE UP (ref 0.4–2)
BUN SERPL-MCNC: 6 MG/DL — LOW (ref 8–20)
CALCIUM SERPL-MCNC: 10 MG/DL — SIGNIFICANT CHANGE UP (ref 8.6–10.2)
CHLORIDE SERPL-SCNC: 98 MMOL/L — SIGNIFICANT CHANGE UP (ref 98–107)
CO2 SERPL-SCNC: 25 MMOL/L — SIGNIFICANT CHANGE UP (ref 22–29)
COLOR FLD: ABNORMAL
CREAT SERPL-MCNC: 0.81 MG/DL — SIGNIFICANT CHANGE UP (ref 0.5–1.3)
EOSINOPHIL # BLD AUTO: 0.5 K/UL — SIGNIFICANT CHANGE UP (ref 0–0.5)
EOSINOPHIL # FLD: 1 % — SIGNIFICANT CHANGE UP
EOSINOPHIL NFR BLD AUTO: 5.4 % — SIGNIFICANT CHANGE UP (ref 0–6)
FLUID INTAKE SUBSTANCE CLASS: SIGNIFICANT CHANGE UP
FLUID SEGMENTED GRANULOCYTES: 68 % — SIGNIFICANT CHANGE UP
GLUCOSE BLDC GLUCOMTR-MCNC: 105 MG/DL — HIGH (ref 70–99)
GLUCOSE BLDC GLUCOMTR-MCNC: 107 MG/DL — HIGH (ref 70–99)
GLUCOSE BLDC GLUCOMTR-MCNC: 112 MG/DL — HIGH (ref 70–99)
GLUCOSE SERPL-MCNC: 114 MG/DL — SIGNIFICANT CHANGE UP (ref 70–115)
GRAM STN FLD: SIGNIFICANT CHANGE UP
HCT VFR BLD CALC: 35.3 % — LOW (ref 37–47)
HGB BLD-MCNC: 11.3 G/DL — LOW (ref 12–16)
LACTATE BLDV-MCNC: 1.2 MMOL/L — SIGNIFICANT CHANGE UP (ref 0.5–2)
LYMPHOCYTES # BLD AUTO: 1.6 K/UL — SIGNIFICANT CHANGE UP (ref 1–4.8)
LYMPHOCYTES # BLD AUTO: 18.7 % — LOW (ref 20–55)
LYMPHOCYTES # FLD: 22 % — SIGNIFICANT CHANGE UP
MCHC RBC-ENTMCNC: 28.6 PG — SIGNIFICANT CHANGE UP (ref 27–31)
MCHC RBC-ENTMCNC: 32 G/DL — SIGNIFICANT CHANGE UP (ref 32–36)
MCV RBC AUTO: 89.4 FL — SIGNIFICANT CHANGE UP (ref 81–99)
MONOCYTES # BLD AUTO: 0.6 K/UL — SIGNIFICANT CHANGE UP (ref 0–0.8)
MONOCYTES NFR BLD AUTO: 7.2 % — SIGNIFICANT CHANGE UP (ref 3–10)
MONOS+MACROS # FLD: 9 % — SIGNIFICANT CHANGE UP
NEUTROPHILS # BLD AUTO: 5.8 K/UL — SIGNIFICANT CHANGE UP (ref 1.8–8)
NEUTROPHILS NFR BLD AUTO: 67.6 % — SIGNIFICANT CHANGE UP (ref 37–73)
PLATELET # BLD AUTO: 438 K/UL — HIGH (ref 150–400)
POTASSIUM SERPL-MCNC: 4 MMOL/L — SIGNIFICANT CHANGE UP (ref 3.5–5.3)
POTASSIUM SERPL-SCNC: 4 MMOL/L — SIGNIFICANT CHANGE UP (ref 3.5–5.3)
PROT SERPL-MCNC: 7.9 G/DL — SIGNIFICANT CHANGE UP (ref 6.6–8.7)
RBC # BLD: 3.95 M/UL — LOW (ref 4.4–5.2)
RBC # FLD: 15.1 % — SIGNIFICANT CHANGE UP (ref 11–15.6)
RCV VOL RI: HIGH /UL (ref 0–5)
SODIUM SERPL-SCNC: 140 MMOL/L — SIGNIFICANT CHANGE UP (ref 135–145)
SPECIMEN SOURCE: SIGNIFICANT CHANGE UP
TOTAL NUCLEATED CELL COUNT, BODY FLUID: 1655 /UL — HIGH (ref 0–5)
TUBE TYPE: SIGNIFICANT CHANGE UP
WBC # BLD: 8.6 K/UL — SIGNIFICANT CHANGE UP (ref 4.8–10.8)
WBC # FLD AUTO: 8.6 K/UL — SIGNIFICANT CHANGE UP (ref 4.8–10.8)

## 2018-07-03 PROCEDURE — 93010 ELECTROCARDIOGRAM REPORT: CPT

## 2018-07-03 RX ORDER — MORPHINE SULFATE 50 MG/1
4 CAPSULE, EXTENDED RELEASE ORAL ONCE
Qty: 0 | Refills: 0 | Status: DISCONTINUED | OUTPATIENT
Start: 2018-07-03 | End: 2018-07-03

## 2018-07-03 RX ORDER — CLONAZEPAM 1 MG
0.5 TABLET ORAL
Qty: 0 | Refills: 0 | COMMUNITY

## 2018-07-03 RX ORDER — SODIUM CHLORIDE 9 MG/ML
3 INJECTION INTRAMUSCULAR; INTRAVENOUS; SUBCUTANEOUS EVERY 8 HOURS
Qty: 0 | Refills: 0 | Status: DISCONTINUED | OUTPATIENT
Start: 2018-07-03 | End: 2018-07-06

## 2018-07-03 RX ORDER — ACETAMINOPHEN 500 MG
650 TABLET ORAL EVERY 6 HOURS
Qty: 0 | Refills: 0 | Status: DISCONTINUED | OUTPATIENT
Start: 2018-07-03 | End: 2018-07-06

## 2018-07-03 RX ORDER — VANCOMYCIN HCL 1 G
1250 VIAL (EA) INTRAVENOUS EVERY 12 HOURS
Qty: 0 | Refills: 0 | Status: DISCONTINUED | OUTPATIENT
Start: 2018-07-03 | End: 2018-07-06

## 2018-07-03 RX ORDER — EPINEPHRINE 0.3 MG/.3ML
0 INJECTION INTRAMUSCULAR; SUBCUTANEOUS
Qty: 0 | Refills: 0 | COMMUNITY

## 2018-07-03 RX ORDER — SODIUM CHLORIDE 9 MG/ML
3 INJECTION INTRAMUSCULAR; INTRAVENOUS; SUBCUTANEOUS ONCE
Qty: 0 | Refills: 0 | Status: COMPLETED | OUTPATIENT
Start: 2018-07-03 | End: 2018-07-03

## 2018-07-03 RX ORDER — SACCHAROMYCES BOULARDII 250 MG
250 POWDER IN PACKET (EA) ORAL
Qty: 0 | Refills: 0 | Status: DISCONTINUED | OUTPATIENT
Start: 2018-07-03 | End: 2018-07-06

## 2018-07-03 RX ORDER — INSULIN LISPRO 100/ML
VIAL (ML) SUBCUTANEOUS
Qty: 0 | Refills: 0 | Status: DISCONTINUED | OUTPATIENT
Start: 2018-07-03 | End: 2018-07-04

## 2018-07-03 RX ORDER — SODIUM CHLORIDE 9 MG/ML
1000 INJECTION, SOLUTION INTRAVENOUS
Qty: 0 | Refills: 0 | Status: DISCONTINUED | OUTPATIENT
Start: 2018-07-03 | End: 2018-07-04

## 2018-07-03 RX ORDER — DEXTROSE 50 % IN WATER 50 %
12.5 SYRINGE (ML) INTRAVENOUS ONCE
Qty: 0 | Refills: 0 | Status: DISCONTINUED | OUTPATIENT
Start: 2018-07-03 | End: 2018-07-04

## 2018-07-03 RX ORDER — OXYCODONE AND ACETAMINOPHEN 5; 325 MG/1; MG/1
1 TABLET ORAL EVERY 4 HOURS
Qty: 0 | Refills: 0 | Status: DISCONTINUED | OUTPATIENT
Start: 2018-07-03 | End: 2018-07-05

## 2018-07-03 RX ORDER — LEVOTHYROXINE SODIUM 125 MCG
137 TABLET ORAL DAILY
Qty: 0 | Refills: 0 | Status: DISCONTINUED | OUTPATIENT
Start: 2018-07-03 | End: 2018-07-06

## 2018-07-03 RX ORDER — LISINOPRIL 2.5 MG/1
40 TABLET ORAL DAILY
Qty: 0 | Refills: 0 | Status: DISCONTINUED | OUTPATIENT
Start: 2018-07-03 | End: 2018-07-06

## 2018-07-03 RX ORDER — ENOXAPARIN SODIUM 100 MG/ML
40 INJECTION SUBCUTANEOUS EVERY 24 HOURS
Qty: 0 | Refills: 0 | Status: DISCONTINUED | OUTPATIENT
Start: 2018-07-03 | End: 2018-07-06

## 2018-07-03 RX ORDER — DEXTROSE 50 % IN WATER 50 %
15 SYRINGE (ML) INTRAVENOUS ONCE
Qty: 0 | Refills: 0 | Status: DISCONTINUED | OUTPATIENT
Start: 2018-07-03 | End: 2018-07-04

## 2018-07-03 RX ORDER — GLUCAGON INJECTION, SOLUTION 0.5 MG/.1ML
1 INJECTION, SOLUTION SUBCUTANEOUS ONCE
Qty: 0 | Refills: 0 | Status: DISCONTINUED | OUTPATIENT
Start: 2018-07-03 | End: 2018-07-04

## 2018-07-03 RX ORDER — MORPHINE SULFATE 50 MG/1
4 CAPSULE, EXTENDED RELEASE ORAL EVERY 4 HOURS
Qty: 0 | Refills: 0 | Status: DISCONTINUED | OUTPATIENT
Start: 2018-07-03 | End: 2018-07-05

## 2018-07-03 RX ORDER — IPRATROPIUM/ALBUTEROL SULFATE 18-103MCG
3 AEROSOL WITH ADAPTER (GRAM) INHALATION EVERY 6 HOURS
Qty: 0 | Refills: 0 | Status: DISCONTINUED | OUTPATIENT
Start: 2018-07-03 | End: 2018-07-06

## 2018-07-03 RX ORDER — PANTOPRAZOLE SODIUM 20 MG/1
40 TABLET, DELAYED RELEASE ORAL
Qty: 0 | Refills: 0 | Status: DISCONTINUED | OUTPATIENT
Start: 2018-07-03 | End: 2018-07-06

## 2018-07-03 RX ORDER — AMLODIPINE BESYLATE 2.5 MG/1
5 TABLET ORAL DAILY
Qty: 0 | Refills: 0 | Status: DISCONTINUED | OUTPATIENT
Start: 2018-07-03 | End: 2018-07-06

## 2018-07-03 RX ORDER — ONDANSETRON 8 MG/1
4 TABLET, FILM COATED ORAL EVERY 6 HOURS
Qty: 0 | Refills: 0 | Status: DISCONTINUED | OUTPATIENT
Start: 2018-07-03 | End: 2018-07-06

## 2018-07-03 RX ORDER — FOLIC ACID 0.8 MG
1 TABLET ORAL DAILY
Qty: 0 | Refills: 0 | Status: DISCONTINUED | OUTPATIENT
Start: 2018-07-03 | End: 2018-07-06

## 2018-07-03 RX ORDER — CLONAZEPAM 1 MG
0.5 TABLET ORAL THREE TIMES A DAY
Qty: 0 | Refills: 0 | Status: DISCONTINUED | OUTPATIENT
Start: 2018-07-03 | End: 2018-07-06

## 2018-07-03 RX ADMIN — MORPHINE SULFATE 4 MILLIGRAM(S): 50 CAPSULE, EXTENDED RELEASE ORAL at 06:01

## 2018-07-03 RX ADMIN — Medication 250 MILLIGRAM(S): at 06:01

## 2018-07-03 RX ADMIN — Medication 137 MICROGRAM(S): at 06:02

## 2018-07-03 RX ADMIN — MORPHINE SULFATE 4 MILLIGRAM(S): 50 CAPSULE, EXTENDED RELEASE ORAL at 20:25

## 2018-07-03 RX ADMIN — PANTOPRAZOLE SODIUM 40 MILLIGRAM(S): 20 TABLET, DELAYED RELEASE ORAL at 08:56

## 2018-07-03 RX ADMIN — LISINOPRIL 40 MILLIGRAM(S): 2.5 TABLET ORAL at 06:02

## 2018-07-03 RX ADMIN — MORPHINE SULFATE 4 MILLIGRAM(S): 50 CAPSULE, EXTENDED RELEASE ORAL at 21:00

## 2018-07-03 RX ADMIN — MORPHINE SULFATE 4 MILLIGRAM(S): 50 CAPSULE, EXTENDED RELEASE ORAL at 02:30

## 2018-07-03 RX ADMIN — SODIUM CHLORIDE 3 MILLILITER(S): 9 INJECTION INTRAMUSCULAR; INTRAVENOUS; SUBCUTANEOUS at 05:58

## 2018-07-03 RX ADMIN — AMLODIPINE BESYLATE 5 MILLIGRAM(S): 2.5 TABLET ORAL at 06:02

## 2018-07-03 RX ADMIN — SODIUM CHLORIDE 3 MILLILITER(S): 9 INJECTION INTRAMUSCULAR; INTRAVENOUS; SUBCUTANEOUS at 01:36

## 2018-07-03 RX ADMIN — ENOXAPARIN SODIUM 40 MILLIGRAM(S): 100 INJECTION SUBCUTANEOUS at 11:27

## 2018-07-03 RX ADMIN — Medication 250 MILLIGRAM(S): at 17:28

## 2018-07-03 RX ADMIN — Medication 650 MILLIGRAM(S): at 11:27

## 2018-07-03 RX ADMIN — SODIUM CHLORIDE 3 MILLILITER(S): 9 INJECTION INTRAMUSCULAR; INTRAVENOUS; SUBCUTANEOUS at 14:15

## 2018-07-03 RX ADMIN — ONDANSETRON 4 MILLIGRAM(S): 8 TABLET, FILM COATED ORAL at 06:02

## 2018-07-03 RX ADMIN — SODIUM CHLORIDE 3 MILLILITER(S): 9 INJECTION INTRAMUSCULAR; INTRAVENOUS; SUBCUTANEOUS at 22:18

## 2018-07-03 RX ADMIN — MORPHINE SULFATE 4 MILLIGRAM(S): 50 CAPSULE, EXTENDED RELEASE ORAL at 03:10

## 2018-07-03 RX ADMIN — Medication 650 MILLIGRAM(S): at 12:30

## 2018-07-03 RX ADMIN — Medication 166.67 MILLIGRAM(S): at 14:40

## 2018-07-03 RX ADMIN — Medication 1 MILLIGRAM(S): at 11:28

## 2018-07-03 NOTE — ED PROVIDER NOTE - PROGRESS NOTE DETAILS
Spoke with ortho PA who will evaluate patient in the ED. Potential need to wash out knee and obtain wound cultures before IV antibiotics are given.

## 2018-07-03 NOTE — H&P ADULT - PROBLEM SELECTOR PLAN 1
Unclear why patient given Biaxin for RLE cellulitis with hx of MRSA colonization. No evidence of deep tissue infection or septic joint. No fever, leukocytosis or shift. Will check blood cx, follow temp/wbc, clinical exams, and cover with Vancomycin. If has improvement w/neg blood cx can likely be dcd on Bactrim. Add probiotics, spirometer, pain regimen, bowel regimen, PT, DVT-P. Repeat LE Doppler to r/o DVT. Ortho consult noted.

## 2018-07-03 NOTE — PHYSICAL THERAPY INITIAL EVALUATION ADULT - ADDITIONAL COMMENTS
Pt reports; before surgery she was using a 4 prong cane, since surgery she has used a RW but just prior to this admission was ambulating independently without an AD. Lives in a house with 1 step to enter. Has a RW, raised toilet seat and a shower seat.

## 2018-07-03 NOTE — H&P ADULT - EXTREMITIES COMMENTS
RLE knee incision clean, no drainage  Right calf/shin, inner thigh with blanching erythema, warmth and pain. distal pulses +2

## 2018-07-03 NOTE — PHYSICAL THERAPY INITIAL EVALUATION ADULT - ACTIVE RANGE OF MOTION EXAMINATION, REHAB EVAL
deficits as listed below/bilateral  lower extremity Active ROM was WFL (within functional limits)/R knee flexion to 100deg/bilateral upper extremity Active ROM was WFL (within functional limits)

## 2018-07-03 NOTE — PROGRESS NOTE ADULT - PROBLEM SELECTOR PLAN 1
Spoke with mom, who stated that she is going to keep up with the protocol given in the ED, and she will call back if needed.   h/o MRSA colonization.  Continue Vancomycin.  Pt now denies h/o anaphylactic reaction, and specifically not to Vancomycin.  Ortho input noted - low suspicion of joint infection.  F/u cultures.

## 2018-07-03 NOTE — H&P ADULT - HISTORY OF PRESENT ILLNESS
66 y/o female s/p right TKA on 6/19 for DJD who had an uneventful surgery and post op course dcd home with home PT presents to ED c/o increasing right lower leg, and this redness, pain, and swelling. She called her PMD 4 days ago concerned about cellulitis and was sent for o/p U/S which was reported as negative for DVT. She was then placed on Biaxin which she has been taking for 4 days with no improvement. She admits to chills, but denies fever, SOB, CP, N/V. In the ED patient with evidence of RLE cellulitis, no septic criteria. Incision looks clean with no drainage and knee with goo ROM. She was noted to have pos. MRSA screen on previous hospitalization. She will be admitted for IV abx blood cultures.

## 2018-07-03 NOTE — ED PROVIDER NOTE - OBJECTIVE STATEMENT
66 y/o F presents to ED c/o surgical complication onset today. Patient states she had R knee surgery on June 19th, and when she was discharged, had slight redness to the area. However, 6 days ago, noticed that the surgical site began to get more red, warm and swollen. Also notes having associated chills and nausea. Followed up with PMD 3 days ago, who was concerned for possible cellulitis, and put patient on Biaxin. Has not seen improvement and notices that the redness is now traveling further up her RLE. Denies fevers, vomiting, diarrhea or constipation. Patient notes she has several allergies to medications, and can only take Bactrim and Biaxin. No further acute complaints at this time.     Orthopedic Surgeon: Dr. Ruff 66 y/o F presents to ED c/o surgical complication onset approximately 1 week ago, worsening today. Patient states she had R knee surgery on June 19th, and when she was discharged, had slight redness to the area. However, 6 days ago, noticed that the surgical site began to get more red, warm and swollen. Also notes having associated chills and nausea. Followed up with PMD 3 days ago, who was concerned for possible cellulitis, and put patient on Biaxin. Has not seen improvement and notices that the redness is now traveling further up her RLE. Denies fevers, vomiting, diarrhea or constipation. Patient notes she has several allergies to medications, and can only take Bactrim and Biaxin. No further acute complaints at this time.     Orthopedic Surgeon: Dr. Ruff 66 y/o F presents to ED c/o surgical complication onset approximately 1 week ago, worsening today. Patient states she had R knee surgery on June 19th, and when she was discharged, had slight redness to the area. However, 6 days ago, noticed that the surgical site began to get more red, warm and swollen. Also notes having associated chills and nausea. Followed up with PMD 3 days ago, who was concerned for possible cellulitis, and put patient on Biaxin. Had outpatient doppler done to r/o DVT but it came back negative. Has not seen improvement since starting the antibiotics and notices that the redness is now traveling further up her RLE. Denies fevers, vomiting, diarrhea or constipation. Patient notes she has several allergies to medications, and can only take Bactrim and Biaxin. No further acute complaints at this time.     Orthopedic Surgeon: Dr. Ruff

## 2018-07-03 NOTE — ED PROVIDER NOTE - MEDICAL DECISION MAKING DETAILS
Patient with acute cellulitis, failed outpatient therapy, will need admission for IV antibiotics and require continued evaluation.

## 2018-07-03 NOTE — CONSULT NOTE ADULT - SUBJECTIVE AND OBJECTIVE BOX
Pt Name: BEAU LOVE    MRN: [id number]      Patient is a 67y Female presenting to the emergency department with a chief complaint of worsening RLE cellulitis x 1 week. Pt is s/p right total knee arthroplasty with Dr. Ruff on . Pt states that she was doing well and 1 week ago developed redness of her right foot/ankle that became worse and redness traveled up her right leg to her calf. Pt states that she saw her PCP who placed her on Biaxin to treat for cellulitis. Pt also had ultrasound done at [facility] and states that DVT study was negative for acute DVT. Pt admits to minor improvement when she first began abx, but shortly therafter her redness worsened and is now just above her knee. Pt states that she is able to move her knee without any difficulty and mild pain reported at the knee/leg. Pt otherwise denies recent trauma/injury, fevers/chills, c/p, sob, abdominal pain, n/v, numbness/tingling and has no other complaints.      REVIEW OF SYSTEMS    General: Alert, responsive, in NAD    Skin/Breast: +redness/cellulitis of the RLE  	  Ophthalmologic: No visual changes. No redness.   	  ENMT:	No discharge. No swelling.    Respiratory and Thorax: No difficulty breathing. No cough.  	   Cardiovascular:	No chest pain. No palpitations.    Gastrointestinal:	 No abdominal pain. No diarrhea.     Genitourinary: No dysuria. No bleeding.    Musculoskeletal: SEE HPI.    Neurological: No sensory or motor changes.     Psychiatric: No anxiety or depression.    Hematology/Lymphatics: No swelling.    Endocrine: No Hx of diabetes.    ROS is otherwise negative.    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  Risk factors for obstructive sleep apnea  Alcoholism  UTI (urinary tract infection): frequent, last treated on 12/10/13 X 10 days with Bactrim, last cultured on  which was negative  Meniscus tear: left knee  Osteoarthritis  MVP (mitral valve prolapse)  Lactose intolerance  Fibromyalgia  Sjoegren syndrome  Lupus: discoid type  Chronic urticaria  Diverticulitis  Anxiety  H/O allergy: environmental, animal  GERD (gastroesophageal reflux disease)  IBS (irritable bowel syndrome)  Hypothyroidism: h/o Hashimoto Thyroiditis  HTN (hypertension)  Prediabetes  Asthma: well controlled    History of total left knee replacement  S/P lumpectomy of breast: right breast -benign  13  S/P colon resection: 13  Trauma: s/p multiple repair of uterine trauma secondary to an accident as a child with a broom  S/P D&C  Obesity  S/P cholecystectomy:   H/O: hysterectomy: LOUISE, BSO    S/P appendectomy:   H/O ovarian cystectomy      Allergies: banana, coconut, pineapple, mustard, vinegar, sulfite (Other)  Benadryl (Hives)  beta blockers (Anaphylaxis)  Ceclor (Hives)  Cipro (Hives)  dairy products (Unknown)  fentanyl (Hives)  Flagyl (Hives)  hydrocodone (Other)  hydromorphone (Other)  Keflex (Hives)  lactose (Unknown)  penicillin (Anaphylaxis)  Tree Nuts (Unknown)      Medications: clindamycin IVPB 300 milliGRAM(s) IV Intermittent Once  morphine  - Injectable 4 milliGRAM(s) IV Push Once PRN      FAMILY HISTORY:  Family history of diabetes mellitus (Father)  Family history of hypertension (Father)  Family history of coronary thrombosis (Sibling)  Family history of acute myocardial infarction (Father)  Family history of throat cancer (Mother)  : non-contributory    Social History: Denies ETOH abuse.    Ambulation: Walking independently [ x ] With Cane [ ] With Walker [ ]  Bedbound [ ]                           11.3   8.6   )-----------( 438      ( 2018 01:37 )             35.3       07-03    140  |  98  |  6.0<L>  ----------------------------<  114  4.0   |  25.0  |  0.81    Ca    10.0      2018 01:37    TPro  7.9  /  Alb  4.0  /  TBili  0.4  /  DBili  x   /  AST  24  /  ALT  23  /  AlkPhos  90  07-03      Vital Signs Last 24 Hrs  T(C): 36.5 (2018 03:08), Max: 36.8 (2018 22:59)  T(F): 97.7 (2018 03:08), Max: 98.2 (2018 22:59)  HR: 76 (2018 03:08) (76 - 83)  BP: 135/73 (2018 03:08) (117/75 - 135/73)  BP(mean): --  RR: 19 (2018 03:08) (18 - 20)  SpO2: 96% (2018 03:08) (95% - 97%)    Daily Height in cm: 160.02 (2018 22:59)    Daily       PHYSICAL EXAM:      Appearance: Alert, responsive, in no acute distress.    Right Lower Extremity: Sensation is grossly intact to light touch. 5/5 motor function of all extremities. No focal deficits or weaknesses found. +erythema of the RLE from above knee to foot. +diffuse warmth/3+ edema noted. No bleeding. No abrasions. No ulcerations. Incision clean, dry and intact without significant surround redness/warmth to indicate localized infection. 2+ distal pulses. Cap refill < 2 sec. No cyanosis. +Active/passive FROM of the right knee 0-90 degrees without difficulty (mild pain reported at the knee/leg).    Imaging Studies:    A/P:  Pt is a  67y Female s/p Right total knee arthroplasty on  with Dr. Ruff. Pt experiencing worsening RLE erythema/cellulitis that was treated with oral Biaxin abx by pcp with no improvement to date. Pt is afebrile in the ED without an elevated wbc count. Pt also has FROM of her right knee with no difficulty, no other clinical signs to suggest right knee joint involvement. Unable to get in direct contact to discuss case with Dr. Ruff.    PLAN discussed with Dr. Guerrero:   * No further orthopedic intervention needed at this time due to the highly unlikely chance of right knee joint involvement based on clinical presentation  * Further management of RLE cellulitis as per ED attending/hospitalist recommendations of outpatient oral abx vs inpatient IV abx  * WBAT of the RLE  * Follow up with Dr. Ruff in the office as needed  * Return to the ED for any persisting or worsening symptoms.

## 2018-07-03 NOTE — ED ADULT NURSE REASSESSMENT NOTE - NS ED NURSE REASSESS COMMENT FT1
pt a+ox4, sitting in bed comfortably. pt states ortho PA just drained knee, reports severe increase in pain. pt states about 20cc of thick blue/black fluid pulled from knee. pt denies chest pain or SOB. VSS and in no apparent distress. MD orders noted and initiated. pt reports previous reaction to IV abx, unsure if vanco was one of them. pt states she is nervous about taking medication. pt provided call bell, curtain opened and pt advised RN will stay with pt, keep pt in sight and monitor for any adverse reactions. pt verbalizes understanding.
pt AOX4 denies any pain or discomfort at the moment, plan of care explained to pt, pt verbalized understanding, IV patent and flushing without difficulty. no signs of infiltration.

## 2018-07-03 NOTE — PHYSICAL THERAPY INITIAL EVALUATION ADULT - PERTINENT HX OF CURRENT PROBLEM, REHAB EVAL
Pt is a 68 y/o female who presented from home with c/o RLE redness (+) cellulitis. Pt has hx/o R TKA on 6/19/18 at [facility].

## 2018-07-03 NOTE — ED PROVIDER NOTE - MUSCULOSKELETAL, MLM
Spine appears normal, range of motion is not limited in R knee secondary to surgery. Extremities are normal except for R leg, which is swollen with diffuse lower leg tenderness. Erythema of RLE extending from foot to proximal R leg.

## 2018-07-04 LAB
APPEARANCE UR: CLEAR — SIGNIFICANT CHANGE UP
BACTERIA # UR AUTO: ABNORMAL
BASOPHILS # BLD AUTO: 0.1 K/UL — SIGNIFICANT CHANGE UP (ref 0–0.2)
BASOPHILS NFR BLD AUTO: 1 % — SIGNIFICANT CHANGE UP (ref 0–2)
BILIRUB UR-MCNC: NEGATIVE — SIGNIFICANT CHANGE UP
COLOR SPEC: YELLOW — SIGNIFICANT CHANGE UP
DIFF PNL FLD: NEGATIVE — SIGNIFICANT CHANGE UP
EOSINOPHIL # BLD AUTO: 0.5 K/UL — SIGNIFICANT CHANGE UP (ref 0–0.5)
EOSINOPHIL NFR BLD AUTO: 6.3 % — HIGH (ref 0–6)
EPI CELLS # UR: SIGNIFICANT CHANGE UP
GLUCOSE UR QL: NEGATIVE MG/DL — SIGNIFICANT CHANGE UP
HBA1C BLD-MCNC: 5.2 % — SIGNIFICANT CHANGE UP (ref 4–5.6)
HCT VFR BLD CALC: 35 % — LOW (ref 37–47)
HGB BLD-MCNC: 11.2 G/DL — LOW (ref 12–16)
KETONES UR-MCNC: NEGATIVE — SIGNIFICANT CHANGE UP
LEUKOCYTE ESTERASE UR-ACNC: ABNORMAL
LYMPHOCYTES # BLD AUTO: 1.2 K/UL — SIGNIFICANT CHANGE UP (ref 1–4.8)
LYMPHOCYTES # BLD AUTO: 14.8 % — LOW (ref 20–55)
MCHC RBC-ENTMCNC: 29 PG — SIGNIFICANT CHANGE UP (ref 27–31)
MCHC RBC-ENTMCNC: 32 G/DL — SIGNIFICANT CHANGE UP (ref 32–36)
MCV RBC AUTO: 90.7 FL — SIGNIFICANT CHANGE UP (ref 81–99)
MONOCYTES # BLD AUTO: 0.7 K/UL — SIGNIFICANT CHANGE UP (ref 0–0.8)
MONOCYTES NFR BLD AUTO: 8.2 % — SIGNIFICANT CHANGE UP (ref 3–10)
NEUTROPHILS # BLD AUTO: 5.6 K/UL — SIGNIFICANT CHANGE UP (ref 1.8–8)
NEUTROPHILS NFR BLD AUTO: 69.6 % — SIGNIFICANT CHANGE UP (ref 37–73)
NITRITE UR-MCNC: NEGATIVE — SIGNIFICANT CHANGE UP
PH UR: 6 — SIGNIFICANT CHANGE UP (ref 5–8)
PLATELET # BLD AUTO: 387 K/UL — SIGNIFICANT CHANGE UP (ref 150–400)
PROT UR-MCNC: NEGATIVE MG/DL — SIGNIFICANT CHANGE UP
RBC # BLD: 3.86 M/UL — LOW (ref 4.4–5.2)
RBC # FLD: 15.7 % — HIGH (ref 11–15.6)
SP GR SPEC: 1.02 — SIGNIFICANT CHANGE UP (ref 1.01–1.02)
UROBILINOGEN FLD QL: NEGATIVE MG/DL — SIGNIFICANT CHANGE UP
WBC # BLD: 8 K/UL — SIGNIFICANT CHANGE UP (ref 4.8–10.8)
WBC # FLD AUTO: 8 K/UL — SIGNIFICANT CHANGE UP (ref 4.8–10.8)
WBC UR QL: ABNORMAL

## 2018-07-04 PROCEDURE — 99223 1ST HOSP IP/OBS HIGH 75: CPT

## 2018-07-04 PROCEDURE — 99233 SBSQ HOSP IP/OBS HIGH 50: CPT

## 2018-07-04 RX ADMIN — Medication 250 MILLIGRAM(S): at 16:33

## 2018-07-04 RX ADMIN — MORPHINE SULFATE 4 MILLIGRAM(S): 50 CAPSULE, EXTENDED RELEASE ORAL at 06:25

## 2018-07-04 RX ADMIN — OXYCODONE AND ACETAMINOPHEN 1 TABLET(S): 5; 325 TABLET ORAL at 12:22

## 2018-07-04 RX ADMIN — AMLODIPINE BESYLATE 5 MILLIGRAM(S): 2.5 TABLET ORAL at 05:44

## 2018-07-04 RX ADMIN — MORPHINE SULFATE 4 MILLIGRAM(S): 50 CAPSULE, EXTENDED RELEASE ORAL at 02:08

## 2018-07-04 RX ADMIN — Medication 166.67 MILLIGRAM(S): at 12:51

## 2018-07-04 RX ADMIN — OXYCODONE AND ACETAMINOPHEN 1 TABLET(S): 5; 325 TABLET ORAL at 17:42

## 2018-07-04 RX ADMIN — MORPHINE SULFATE 4 MILLIGRAM(S): 50 CAPSULE, EXTENDED RELEASE ORAL at 05:52

## 2018-07-04 RX ADMIN — LISINOPRIL 40 MILLIGRAM(S): 2.5 TABLET ORAL at 05:44

## 2018-07-04 RX ADMIN — PANTOPRAZOLE SODIUM 40 MILLIGRAM(S): 20 TABLET, DELAYED RELEASE ORAL at 05:44

## 2018-07-04 RX ADMIN — SODIUM CHLORIDE 3 MILLILITER(S): 9 INJECTION INTRAMUSCULAR; INTRAVENOUS; SUBCUTANEOUS at 21:45

## 2018-07-04 RX ADMIN — Medication 250 MILLIGRAM(S): at 05:44

## 2018-07-04 RX ADMIN — MORPHINE SULFATE 4 MILLIGRAM(S): 50 CAPSULE, EXTENDED RELEASE ORAL at 22:30

## 2018-07-04 RX ADMIN — Medication 166.67 MILLIGRAM(S): at 23:46

## 2018-07-04 RX ADMIN — MORPHINE SULFATE 4 MILLIGRAM(S): 50 CAPSULE, EXTENDED RELEASE ORAL at 00:57

## 2018-07-04 RX ADMIN — SODIUM CHLORIDE 3 MILLILITER(S): 9 INJECTION INTRAMUSCULAR; INTRAVENOUS; SUBCUTANEOUS at 12:58

## 2018-07-04 RX ADMIN — OXYCODONE AND ACETAMINOPHEN 1 TABLET(S): 5; 325 TABLET ORAL at 12:58

## 2018-07-04 RX ADMIN — ENOXAPARIN SODIUM 40 MILLIGRAM(S): 100 INJECTION SUBCUTANEOUS at 12:22

## 2018-07-04 RX ADMIN — SODIUM CHLORIDE 3 MILLILITER(S): 9 INJECTION INTRAMUSCULAR; INTRAVENOUS; SUBCUTANEOUS at 05:47

## 2018-07-04 RX ADMIN — MORPHINE SULFATE 4 MILLIGRAM(S): 50 CAPSULE, EXTENDED RELEASE ORAL at 22:16

## 2018-07-04 RX ADMIN — Medication 137 MICROGRAM(S): at 05:44

## 2018-07-04 RX ADMIN — Medication 1 MILLIGRAM(S): at 12:22

## 2018-07-04 NOTE — CONSULT NOTE ADULT - SUBJECTIVE AND OBJECTIVE BOX
[facility] [place]  GONSALO COATS MD FACP   CHRISTIANE MUÑOZ MD  Diplomates American Board of Internal Medicine and Infecctious Diseases  [phone]  [id number] [id number]      HPI:  66 y/o female s/p right TKA on  for DJD who had an uneventful surgery and post op course dcd home with home PT presents to ED c/o increasing right lower leg, and this redness, pain, and swelling. She called her PMD 4 days ago concerned about cellulitis and was sent for o/p U/S which was reported as negative for DVT. She was then placed on Biaxin which she has been taking for 4 days with no improvement. She admits to chills, but denies fever, SOB, CP, N/V. In the ED patient with evidence of RLE cellulitis, no septic criteria. Incision looks clean with no drainage and knee with goo ROM. She was noted to have pos. MRSA screen on previous hospitalization.   ASKED TO EVALUATE FROM ID STANDPOINT     PAST MEDICAL & SURGICAL HISTORY:  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  Risk factors for obstructive sleep apnea  Alcoholism  UTI (urinary tract infection): frequent, last treated on 12/10/13 X 10 days with Bactrim, last cultured on  which was negative  Meniscus tear: left knee  Osteoarthritis  MVP (mitral valve prolapse)  Lactose intolerance  Fibromyalgia  Sjoegren syndrome  Lupus: discoid type  Chronic urticaria  Diverticulitis  Anxiety  H/O allergy: environmental, animal  GERD (gastroesophageal reflux disease)  IBS (irritable bowel syndrome)  Hypothyroidism: h/o Hashimoto Thyroiditis  HTN (hypertension)  Prediabetes  Asthma: well controlled    History of total left knee replacement  S/P lumpectomy of breast: right breast -benign  13  S/P colon resection: 13  Trauma: s/p multiple repair of uterine trauma secondary to an accident as a child with a broom  S/P D&C  Obesity  S/P cholecystectomy:   H/O: hysterectomy: LOUISE, BSO    S/P appendectomy:   H/O ovarian cystectomy      ANTIBIOTICS  vancomycin  IVPB 1250 milliGRAM(s) IV Intermittent every 12 hours      Allergies    banana, coconut, pineapple, mustard, vinegar, sulfite (Other)  Benadryl (Hives)  beta blockers (Anaphylaxis)  Ceclor (Hives)  Cipro (Hives)  dairy products (Unknown)  fentanyl (Hives)  Flagyl (Hives)  hydrocodone (Other)  hydromorphone (Other)  Keflex (Hives)  lactose (Unknown)  penicillin (Anaphylaxis)  Tree Nuts (Unknown)    Intolerances        SOCIAL HISTORY:       FAMILY HX   FAMILY HISTORY:  Family history of diabetes mellitus  Family history of hypertension  Family history of coronary thrombosis (Sibling)  Family history of acute myocardial infarction  Family history of throat cancer      Vital Signs Last 24 Hrs  T(C): 36.9 (2018 08:17), Max: 37.2 (2018 17:18)  T(F): 98.4 (2018 08:17), Max: 98.9 (2018 17:18)  HR: 80 (2018 08:17) (77 - 83)  BP: 142/- (2018 08:17) (117/73 - 142/-)  BP(mean): --  RR: 17 (2018 08:17) (17 - 18)  SpO2: 97% (2018 08:17) (93% - 97%)  Drug Dosing Weight  Height (cm): 160.02 (2018 22:59)  Weight (kg): 118.8 (2018 22:59)  BMI (kg/m2): 46.4 (2018 22:59)  BSA (m2): 2.17 (2018 22:59)      REVIEW OF SYSTEMS:    CONSTITUTIONAL:  As per HPI.    HEENT:  Eyes:  No diplopia or blurred vision. ENT:  No earache, sore throat or runny nose.    CARDIOVASCULAR:  No pressure, squeezing, strangling, tightness, heaviness or aching about the chest, neck, axilla or epigastrium.    RESPIRATORY:  No cough, shortness of breath, PND or orthopnea.    GASTROINTESTINAL:  No nausea, vomiting or diarrhea.    GENITOURINARY:  No dysuria, frequency or urgency.    MUSCULOSKELETAL:  As per HPI.    SKIN:  No change in skin, hair or nails.    NEUROLOGIC:  No paresthesias, fasciculations, seizures or weakness.                  PHYSICAL EXAMINATION:    GENERAL: The patient is a well-developed, well-nourished _____ IN NAD   VITAL SIGNS: T(C): 36.9 (18 @ 08:17), Max: 37.2 (18 @ 17:18)  HR: 80 (18 @ 08:17) (77 - 83)  BP: 142/- (18 @ 08:17) (117/73 - 142/-)  RR: 17 (18 @ 08:17) (17 - 18)  SpO2: 97% (18 @ 08:17) (93% - 97%)  Wt(kg): --    HEENT: Head is normocephalic and atraumatic.  ANICTERIC  NECK: Supple. No carotid bruits.  No lymphadenopathy or thyromegaly.    LUNGS: COARSE BREATH SOUNDS    HEART: Regular rate and rhythm without murmur.    ABDOMEN: Soft, nontender, and nondistended.  Positive bowel sounds.  No hepatosplenomegaly was noted. NO REBOUND NO GUARDING    EXTREMITIES:  RIGHT KNEE WITH EDEMA MILD ERYTHEMA STERISTRIP DRESSING IN PLACE     NEUROLOGIC: NON FOCAL      SKIN: No ulceration or induration present. NO RASH        BLOOD CULTURES  Culture Results:   No growth at 1 day.  Culture in progress ( @ 05:49)       URINE CX          LABS:                        11.2   8.0   )-----------( 387      ( 2018 07:49 )             35.0     07-    140  |  98  |  6.0<L>  ----------------------------<  114  4.0   |  25.0  |  0.81    Ca    10.0      2018 01:37    TPro  7.9  /  Alb  4.0  /  TBili  0.4  /  DBili  x   /  AST  24  /  ALT  23  /  AlkPhos  90  03      Urinalysis Basic - ( 2018 10:02 )    Color: Yellow / Appearance: Clear / S.020 / pH: x  Gluc: x / Ketone: Negative  / Bili: Negative / Urobili: Negative mg/dL   Blood: x / Protein: Negative mg/dL / Nitrite: Negative   Leuk Esterase: Moderate / RBC: x / WBC 6-10   Sq Epi: x / Non Sq Epi: Occasional / Bacteria: Occasional        RADIOLOGY & ADDITIONAL STUDIES:      ASSESSMENT/PLAN    66 y/o female s/p right TKA on  for DJD who had an uneventful surgery and post op course dcd home with home PT presents to ED c/o increasing right lower leg, and this redness, pain, and swelling. She called her PMD 4 days ago concerned about cellulitis and was sent for o/p U/S which was reported as negative for DVT. She was then placed on Biaxin which she has been taking for 4 days with no improvement. She admits to chills, but denies fever, SOB, CP, N/V. In the ED patient with evidence of RLE cellulitis, no septic criteria. Incision looks clean with no drainage and knee with goo ROM. She was noted to have pos. MRSA screen on previous hospitalization.  BLOOD CX ARE SO FOR NEGATIVE  FLUID ASPIRATE SO FAR NEGATIVE  CONTINUE IV ABX  WILL FOLLOW UP  PT NON TOXIC              CHRISTIANE TIJEIRNA MD

## 2018-07-04 NOTE — PROVIDER CONTACT NOTE (MEDICATION) - SITUATION
6am dose of vanco not given. Found bag full. New vanco ordered from pharmacy and timing will be adjusted.

## 2018-07-04 NOTE — PROGRESS NOTE ADULT - ASSESSMENT
68 y/o Hypothyroid, HTN, Anxiety, GERD, Asthma, Obesit female with RLE cellulitis, Hx recent Right TKA.

## 2018-07-04 NOTE — PROGRESS NOTE ADULT - PROBLEM SELECTOR PLAN 1
h/o MRSA colonization.  Continue Vancomycin.  Ortho input noted - low suspicion of joint infection.  F/u cultures  ID consulted  patient states she has been treated successfully in past with bactrim.

## 2018-07-05 ENCOUNTER — TRANSCRIPTION ENCOUNTER (OUTPATIENT)
Age: 68
End: 2018-07-05

## 2018-07-05 LAB
CULTURE RESULTS: SIGNIFICANT CHANGE UP
SPECIMEN SOURCE: SIGNIFICANT CHANGE UP
VANCOMYCIN TROUGH SERPL-MCNC: 12.3 UG/ML — SIGNIFICANT CHANGE UP (ref 10–20)
VANCOMYCIN TROUGH SERPL-MCNC: 23.6 UG/ML — HIGH (ref 10–20)

## 2018-07-05 PROCEDURE — 99232 SBSQ HOSP IP/OBS MODERATE 35: CPT

## 2018-07-05 PROCEDURE — 99233 SBSQ HOSP IP/OBS HIGH 50: CPT

## 2018-07-05 RX ORDER — ASPIRIN/CALCIUM CARB/MAGNESIUM 324 MG
1 TABLET ORAL
Qty: 60 | Refills: 0 | OUTPATIENT
Start: 2018-07-05 | End: 2018-08-03

## 2018-07-05 RX ORDER — ESTROGENS, CONJUGATED 0.625 MG
0.3 TABLET ORAL DAILY
Qty: 0 | Refills: 0 | Status: DISCONTINUED | OUTPATIENT
Start: 2018-07-05 | End: 2018-07-06

## 2018-07-05 RX ORDER — LOPERAMIDE HCL 2 MG
2 TABLET ORAL
Qty: 0 | Refills: 0 | Status: DISCONTINUED | OUTPATIENT
Start: 2018-07-05 | End: 2018-07-06

## 2018-07-05 RX ORDER — OXYCODONE HYDROCHLORIDE 5 MG/1
5 TABLET ORAL
Qty: 0 | Refills: 0 | Status: DISCONTINUED | OUTPATIENT
Start: 2018-07-05 | End: 2018-07-06

## 2018-07-05 RX ORDER — MORPHINE SULFATE 50 MG/1
4 CAPSULE, EXTENDED RELEASE ORAL EVERY 6 HOURS
Qty: 0 | Refills: 0 | Status: DISCONTINUED | OUTPATIENT
Start: 2018-07-05 | End: 2018-07-06

## 2018-07-05 RX ORDER — OXYCODONE HYDROCHLORIDE 5 MG/1
10 TABLET ORAL
Qty: 0 | Refills: 0 | Status: DISCONTINUED | OUTPATIENT
Start: 2018-07-05 | End: 2018-07-06

## 2018-07-05 RX ADMIN — Medication 0.5 MILLIGRAM(S): at 16:35

## 2018-07-05 RX ADMIN — Medication 137 MICROGRAM(S): at 07:04

## 2018-07-05 RX ADMIN — ENOXAPARIN SODIUM 40 MILLIGRAM(S): 100 INJECTION SUBCUTANEOUS at 12:41

## 2018-07-05 RX ADMIN — MORPHINE SULFATE 4 MILLIGRAM(S): 50 CAPSULE, EXTENDED RELEASE ORAL at 12:41

## 2018-07-05 RX ADMIN — AMLODIPINE BESYLATE 5 MILLIGRAM(S): 2.5 TABLET ORAL at 07:04

## 2018-07-05 RX ADMIN — PANTOPRAZOLE SODIUM 40 MILLIGRAM(S): 20 TABLET, DELAYED RELEASE ORAL at 07:04

## 2018-07-05 RX ADMIN — Medication 250 MILLIGRAM(S): at 18:06

## 2018-07-05 RX ADMIN — MORPHINE SULFATE 4 MILLIGRAM(S): 50 CAPSULE, EXTENDED RELEASE ORAL at 13:00

## 2018-07-05 RX ADMIN — OXYCODONE HYDROCHLORIDE 5 MILLIGRAM(S): 5 TABLET ORAL at 21:05

## 2018-07-05 RX ADMIN — SODIUM CHLORIDE 3 MILLILITER(S): 9 INJECTION INTRAMUSCULAR; INTRAVENOUS; SUBCUTANEOUS at 22:00

## 2018-07-05 RX ADMIN — SODIUM CHLORIDE 3 MILLILITER(S): 9 INJECTION INTRAMUSCULAR; INTRAVENOUS; SUBCUTANEOUS at 14:25

## 2018-07-05 RX ADMIN — Medication 0.3 MILLIGRAM(S): at 12:41

## 2018-07-05 RX ADMIN — Medication 2 MILLIGRAM(S): at 12:42

## 2018-07-05 RX ADMIN — Medication 166.67 MILLIGRAM(S): at 14:25

## 2018-07-05 RX ADMIN — OXYCODONE HYDROCHLORIDE 5 MILLIGRAM(S): 5 TABLET ORAL at 22:10

## 2018-07-05 RX ADMIN — Medication 1 MILLIGRAM(S): at 12:42

## 2018-07-05 RX ADMIN — Medication 250 MILLIGRAM(S): at 07:04

## 2018-07-05 RX ADMIN — LISINOPRIL 40 MILLIGRAM(S): 2.5 TABLET ORAL at 07:03

## 2018-07-05 RX ADMIN — SODIUM CHLORIDE 3 MILLILITER(S): 9 INJECTION INTRAMUSCULAR; INTRAVENOUS; SUBCUTANEOUS at 07:02

## 2018-07-05 NOTE — DISCHARGE NOTE ADULT - PLAN OF CARE
resolution continue course of antibiotics  follow up with orthopedics in 1 week.  follow up with infectious disease if needed.

## 2018-07-05 NOTE — DISCHARGE NOTE ADULT - CARE PROVIDERS DIRECT ADDRESSES
[email or web address]_Unknown

## 2018-07-05 NOTE — PROGRESS NOTE ADULT - PROBLEM SELECTOR PLAN 1
h/o MRSA colonization.  Continue Vancomycin.  Ortho input noted - low suspicion of joint infection.  F/u cultures  ID consulted  patient states she has been treated successfully in past with bactrim.    likely dc tomorrow on bactrim  pain meds adjusted, PO regimen stressed

## 2018-07-05 NOTE — PROGRESS NOTE ADULT - ATTENDING COMMENTS
RLE cellulitis appears to be improving on IV vancomycin.  Knee aspiration cell count not concerning for infection, and cultures are pending.  Knee exam also not concerning for infection involving the knee.  Recommend continued antibiotic treatment of lower leg cellulitis per medical team.  No plan for any orthopaedic intervention at this time.  Will follow up aspiration cultures as outpatient if patient is discharged.
The Hospitalist Service will assume care of this patient beginning tomorrow.
d/w ID and orthopedics  restart home premarin.    dc planning for am

## 2018-07-05 NOTE — DISCHARGE NOTE ADULT - ADDITIONAL INSTRUCTIONS
Weight bear as tolerated. Follow up with Dr Ruff next week. Continue antibiotics per ID. Continue Lovenox until completed and then aspirin 325mg twice daily as directed.

## 2018-07-05 NOTE — DISCHARGE NOTE ADULT - MEDICATION SUMMARY - MEDICATIONS TO TAKE
I will START or STAY ON the medications listed below when I get home from the hospital:    vitamin d  -- 2000 milligram(s)  once a day  -- Indication: For vitamin    acetaminophen 325 mg oral tablet  -- 2 tab(s) by mouth every 6 hours, As needed, Mild Pain (1 - 3)  -- Indication: For Pain    oxyCODONE 10 mg oral tablet  -- 1 tab(s) by mouth every 4 to 6 hours, As Needed -Mild Pain (1 - 3) MDD:6  -- Indication: For Pain    Aspirin Enteric Coated 325 mg oral delayed release tablet  -- 1 tab(s) by mouth 2 times a day   -- Swallow whole.  Do not crush.  Take with food or milk.    -- Indication: For dvt prophylaxis    lisinopril 40 mg oral tablet  -- orally 2 times a day  -- Indication: For Hypertension    enoxaparin 30 mg/0.3 mL injectable solution  -- 30 milligram(s) injectable 2 times a day  -- Indication: For dvt prophylaxis    clonazePAM 1 mg oral tablet  -- 0.5 milligram(s) by mouth 3 times a day while on narcotics, as per Dr Crowell  -- Indication: For Anxiety    hydrOXYzine hydrochloride 50 mg oral tablet  -- 1 tab(s) by mouth 3 times a day  -- Indication: For Anxiety    Xopenex HFA 45 mcg/inh inhalation aerosol  -- 2 puff(s) inhaled every 4 hours, As Needed  -- Indication: For Asthma, unspecified asthma severity, unspecified whether complicated, unspecified whether persistent    amLODIPine 5 mg oral tablet  -- 1 tab(s) by mouth once a day  -- Indication: For Hypertension    EpiPen Auto-Injector 0.3 mg injectable kit  -- Indication: For Asthma, unspecified asthma severity, unspecified whether complicated, unspecified whether persistent    Senna S 50 mg-8.6 mg oral tablet  -- 2 tab(s) by mouth once a day (at bedtime)   -- Medication should be taken with plenty of water.    -- Indication: For Constipation    pantoprazole 40 mg oral delayed release tablet  -- 1 tab(s) by mouth once a day  -- Indication: For reflux    conjugated estrogens 0.3 mg oral tablet  -- 1 tab(s) by mouth once a day  -- Indication: For Hormone replacement    Bactrim  mg-160 mg oral tablet  -- 1 tab(s) by mouth 2 times a day   -- Avoid prolonged or excessive exposure to direct and/or artificial sunlight while taking this medication.  Finish all this medication unless otherwise directed by prescriber.  Medication should be taken with plenty of water.    -- Indication: For Cellulitis    levothyroxine 137 mcg (0.137 mg) oral tablet  -- orally every other day  -- Indication: For Hypothyroidism, unspecified type    levothyroxine 150 mcg (0.15 mg) oral tablet  -- orally every other day  -- Indication: For Hypothyroidism, unspecified type    Multiple Vitamins oral tablet  -- 1 tab(s) by mouth once a day  -- Indication: For vitamin

## 2018-07-05 NOTE — PROGRESS NOTE ADULT - ASSESSMENT
66 y/o female s/p right TKA on 6/19 for DJD who had an uneventful surgery and post op course dcd home with home PT presents to ED c/o increasing right lower leg, and this redness, pain, and swelling. She called her PMD 4 days ago concerned about cellulitis and was sent for o/p U/S which was reported as negative for DVT. She was then placed on Biaxin which she has been taking for 4 days with no improvement. She admits to chills, but denies fever, SOB, CP, N/V. In the ED patient with evidence of RLE cellulitis, no septic criteria. Incision looks clean with no drainage and knee with goo ROM. She was noted to have pos. MRSA screen on previous hospitalization.  BLOOD CX ARE SO FOR NEGATIVE  FLUID ASPIRATE   SO FAR NEGATIVE  CONTINUE IV ABX FOR TODAY  PROBABLE CHANGE  TO ORAL AND POSSIBLE D/C IN AM  WILL FOLLOW UP  D/W HOSPITALIST

## 2018-07-05 NOTE — DISCHARGE NOTE ADULT - PATIENT PORTAL LINK FT
You can access the [facility], offered by [facility], by registering with the following website: [email or web address]

## 2018-07-05 NOTE — DISCHARGE NOTE ADULT - CARE PLAN
Principal Discharge DX:	Cellulitis of right lower extremity  Goal:	resolution  Assessment and plan of treatment:	continue course of antibiotics  follow up with orthopedics in 1 week.  follow up with infectious disease if needed.  Secondary Diagnosis:	History of right knee joint replacement  Secondary Diagnosis:	Hypothyroidism, unspecified type

## 2018-07-05 NOTE — DISCHARGE NOTE ADULT - MEDICATION SUMMARY - MEDICATIONS TO STOP TAKING
I will STOP taking the medications listed below when I get home from the hospital:    potassium chloride 10 mEq oral tablet, extended release    albuterol    Librium 25 mg oral capsule  -- 3 tab(s) by mouth once a day for the next six days.  (Due to alcohol abuse)

## 2018-07-05 NOTE — DISCHARGE NOTE ADULT - HOSPITAL COURSE
66 y/o F Hypothyroid, HTN, Anxiety, GERD, Asthma, Obesity presents with RLE cellulitis, Hx recent Right TKA.    Seen by orthopedics, arthrocentesis performed and not indicative of infection with negative cultures.  seen by infectious disease, received IV vancomycin with good response and transitioned to oral bactrim.  pain controlled and ambulating.    stable for discharge home dc planning 35 minutes.  vss afebrile no acute complaints rrr s1s2 ctab soft abdomen mild RLE edema with c/d/i right knee incision site without drainage. minimal erythema anterior shin.

## 2018-07-05 NOTE — DISCHARGE NOTE ADULT - CARE PROVIDER_API CALL
Davis Ruff), Orthopaedic Surgery  [address] [place] [address]  Phone: [phone]  Fax: [phone] Davis Ruff), Orthopaedic Surgery  [address] [place] [address]  Phone: [phone]  Fax: [phone]    Catarino Nichole), Infectious Disease; Internal Medicine  [address] [place] [address]  Phone: [phone]  Fax: [phone]

## 2018-07-06 VITALS
RESPIRATION RATE: 20 BRPM | DIASTOLIC BLOOD PRESSURE: 71 MMHG | TEMPERATURE: 98 F | HEART RATE: 97 BPM | SYSTOLIC BLOOD PRESSURE: 129 MMHG | OXYGEN SATURATION: 98 %

## 2018-07-06 LAB
HCT VFR BLD CALC: 35.8 % — LOW (ref 37–47)
HGB BLD-MCNC: 11.6 G/DL — LOW (ref 12–16)
MCHC RBC-ENTMCNC: 28.7 PG — SIGNIFICANT CHANGE UP (ref 27–31)
MCHC RBC-ENTMCNC: 32.4 G/DL — SIGNIFICANT CHANGE UP (ref 32–36)
MCV RBC AUTO: 88.6 FL — SIGNIFICANT CHANGE UP (ref 81–99)
PLATELET # BLD AUTO: 437 K/UL — HIGH (ref 150–400)
RBC # BLD: 4.04 M/UL — LOW (ref 4.4–5.2)
RBC # FLD: 15.2 % — SIGNIFICANT CHANGE UP (ref 11–15.6)
WBC # BLD: 8.3 K/UL — SIGNIFICANT CHANGE UP (ref 4.8–10.8)
WBC # FLD AUTO: 8.3 K/UL — SIGNIFICANT CHANGE UP (ref 4.8–10.8)

## 2018-07-06 PROCEDURE — 80053 COMPREHEN METABOLIC PANEL: CPT

## 2018-07-06 PROCEDURE — 87086 URINE CULTURE/COLONY COUNT: CPT

## 2018-07-06 PROCEDURE — 83036 HEMOGLOBIN GLYCOSYLATED A1C: CPT

## 2018-07-06 PROCEDURE — 96374 THER/PROPH/DIAG INJ IV PUSH: CPT

## 2018-07-06 PROCEDURE — 87205 SMEAR GRAM STAIN: CPT

## 2018-07-06 PROCEDURE — 85027 COMPLETE CBC AUTOMATED: CPT

## 2018-07-06 PROCEDURE — 89051 BODY FLUID CELL COUNT: CPT

## 2018-07-06 PROCEDURE — 97163 PT EVAL HIGH COMPLEX 45 MIN: CPT

## 2018-07-06 PROCEDURE — 81001 URINALYSIS AUTO W/SCOPE: CPT

## 2018-07-06 PROCEDURE — 83605 ASSAY OF LACTIC ACID: CPT

## 2018-07-06 PROCEDURE — 87075 CULTR BACTERIA EXCEPT BLOOD: CPT

## 2018-07-06 PROCEDURE — 99232 SBSQ HOSP IP/OBS MODERATE 35: CPT

## 2018-07-06 PROCEDURE — 99285 EMERGENCY DEPT VISIT HI MDM: CPT | Mod: 25

## 2018-07-06 PROCEDURE — 87040 BLOOD CULTURE FOR BACTERIA: CPT

## 2018-07-06 PROCEDURE — 87070 CULTURE OTHR SPECIMN AEROBIC: CPT

## 2018-07-06 PROCEDURE — 96375 TX/PRO/DX INJ NEW DRUG ADDON: CPT

## 2018-07-06 PROCEDURE — 93005 ELECTROCARDIOGRAM TRACING: CPT

## 2018-07-06 PROCEDURE — 96376 TX/PRO/DX INJ SAME DRUG ADON: CPT

## 2018-07-06 PROCEDURE — 99239 HOSP IP/OBS DSCHRG MGMT >30: CPT

## 2018-07-06 PROCEDURE — 82962 GLUCOSE BLOOD TEST: CPT

## 2018-07-06 PROCEDURE — 80202 ASSAY OF VANCOMYCIN: CPT

## 2018-07-06 PROCEDURE — 36415 COLL VENOUS BLD VENIPUNCTURE: CPT

## 2018-07-06 RX ORDER — ACETAMINOPHEN 500 MG
2 TABLET ORAL
Qty: 0 | Refills: 0 | COMMUNITY
Start: 2018-07-06

## 2018-07-06 RX ORDER — OXYCODONE HYDROCHLORIDE 5 MG/1
1 TABLET ORAL
Qty: 30 | Refills: 0 | OUTPATIENT
Start: 2018-07-06 | End: 2018-07-10

## 2018-07-06 RX ORDER — AMLODIPINE BESYLATE 2.5 MG/1
1 TABLET ORAL
Qty: 0 | Refills: 0 | COMMUNITY
Start: 2018-07-06

## 2018-07-06 RX ORDER — ALBUTEROL 90 UG/1
0 AEROSOL, METERED ORAL
Qty: 0 | Refills: 0 | COMMUNITY

## 2018-07-06 RX ORDER — AMLODIPINE BESYLATE 2.5 MG/1
1 TABLET ORAL
Qty: 0 | Refills: 0 | COMMUNITY

## 2018-07-06 RX ORDER — AZTREONAM 2 G
1 VIAL (EA) INJECTION
Qty: 14 | Refills: 0 | OUTPATIENT
Start: 2018-07-06 | End: 2018-07-12

## 2018-07-06 RX ORDER — POTASSIUM CHLORIDE 20 MEQ
0 PACKET (EA) ORAL
Qty: 0 | Refills: 0 | COMMUNITY

## 2018-07-06 RX ADMIN — PANTOPRAZOLE SODIUM 40 MILLIGRAM(S): 20 TABLET, DELAYED RELEASE ORAL at 05:31

## 2018-07-06 RX ADMIN — Medication 1 MILLIGRAM(S): at 12:13

## 2018-07-06 RX ADMIN — OXYCODONE HYDROCHLORIDE 10 MILLIGRAM(S): 5 TABLET ORAL at 08:11

## 2018-07-06 RX ADMIN — OXYCODONE HYDROCHLORIDE 10 MILLIGRAM(S): 5 TABLET ORAL at 02:49

## 2018-07-06 RX ADMIN — Medication 0.3 MILLIGRAM(S): at 12:13

## 2018-07-06 RX ADMIN — AMLODIPINE BESYLATE 5 MILLIGRAM(S): 2.5 TABLET ORAL at 05:31

## 2018-07-06 RX ADMIN — Medication 250 MILLIGRAM(S): at 05:31

## 2018-07-06 RX ADMIN — OXYCODONE HYDROCHLORIDE 10 MILLIGRAM(S): 5 TABLET ORAL at 08:45

## 2018-07-06 RX ADMIN — SODIUM CHLORIDE 3 MILLILITER(S): 9 INJECTION INTRAMUSCULAR; INTRAVENOUS; SUBCUTANEOUS at 05:33

## 2018-07-06 RX ADMIN — Medication 137 MICROGRAM(S): at 05:31

## 2018-07-06 RX ADMIN — Medication 2 MILLIGRAM(S): at 05:31

## 2018-07-06 RX ADMIN — OXYCODONE HYDROCHLORIDE 10 MILLIGRAM(S): 5 TABLET ORAL at 01:49

## 2018-07-06 RX ADMIN — Medication 166.67 MILLIGRAM(S): at 00:11

## 2018-07-06 NOTE — PROGRESS NOTE ADULT - SUBJECTIVE AND OBJECTIVE BOX
Pt Name: BEAU LOVE    MRN: [id number]    Patient is a being followed for right lower extremity cellulitis x 1 week s/p total knee arthroplasty by Dr Ruff on . PCP placed her on Biaxin to treat for cellulitis, DVT study outpatient was negative. Pt reports minimal response to outpatient abx. Pt states that she is able to range knee without difficulty or pain. Pt reports noticeable improvement of cellulitis since first dose of IV vancomycin in ED. Pt denies recent trauma/injury, fevers/chills, c/p, sob, abdominal pain, n/v, numbness/tingling and has no other complaints.    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  Risk factors for obstructive sleep apnea  Alcoholism  UTI (urinary tract infection): frequent, last treated on 12/10/13 X 10 days with Bactrim, last cultured on  which was negative  Meniscus tear: left knee  Osteoarthritis  MVP (mitral valve prolapse)  Lactose intolerance  Fibromyalgia  Sjoegren syndrome  Lupus: discoid type  Chronic urticaria  Diverticulitis  Anxiety  H/O allergy: environmental, animal  GERD (gastroesophageal reflux disease)  IBS (irritable bowel syndrome)  Hypothyroidism: h/o Hashimoto Thyroiditis  HTN (hypertension)  Prediabetes  Asthma: well controlled    History of total left knee replacement  S/P lumpectomy of breast: right breast -benign  13  S/P colon resection: 13  Trauma: s/p multiple repair of uterine trauma secondary to an accident as a child with a broom  S/P D&C  Obesity  S/P cholecystectomy:   H/O: hysterectomy: LOUISE, BSO    S/P appendectomy:   H/O ovarian cystectomy      Allergies: banana, coconut, pineapple, mustard, vinegar, sulfite (Other)  Benadryl (Hives)  beta blockers (Anaphylaxis)  Ceclor (Hives)  Cipro (Hives)  dairy products (Unknown)  fentanyl (Hives)  Flagyl (Hives)  hydrocodone (Other)  hydromorphone (Other)  Keflex (Hives)  lactose (Unknown)  penicillin (Anaphylaxis)  Tree Nuts (Unknown)      Medications: acetaminophen   Tablet. 650 milliGRAM(s) Oral every 6 hours PRN  ALBUTerol/ipratropium for Nebulization 3 milliLiter(s) Nebulizer every 6 hours PRN  amLODIPine   Tablet 5 milliGRAM(s) Oral daily  clonazePAM Tablet 0.5 milliGRAM(s) Oral three times a day PRN  enoxaparin Injectable 40 milliGRAM(s) SubCutaneous every 24 hours  folic acid 1 milliGRAM(s) Oral daily  levothyroxine 137 MICROGram(s) Oral daily  lisinopril 40 milliGRAM(s) Oral daily  morphine  - Injectable 4 milliGRAM(s) IV Push every 4 hours PRN  ondansetron Injectable 4 milliGRAM(s) IV Push every 6 hours PRN  oxyCODONE    5 mG/acetaminophen 325 mG 1 Tablet(s) Oral every 4 hours PRN  pantoprazole    Tablet 40 milliGRAM(s) Oral before breakfast  saccharomyces boulardii 250 milliGRAM(s) Oral two times a day  sodium chloride 0.9% lock flush 3 milliLiter(s) IV Push every 8 hours  vancomycin  IVPB 1250 milliGRAM(s) IV Intermittent every 12 hours                        11.2   8.0   )-----------( 387      ( 2018 07:49 )             35.0     07-03    140  |  98  |  6.0<L>  ----------------------------<  114  4.0   |  25.0  |  0.81    Ca    10.0      2018 01:37    TPro  7.9  /  Alb  4.0  /  TBili  0.4  /  DBili  x   /  AST  24  /  ALT  23  /  AlkPhos  90  07-03      PHYSICAL EXAM:    Vital Signs Last 24 Hrs  T(C): 36.9 (2018 08:17), Max: 37.2 (2018 17:18)  T(F): 98.4 (2018 08:17), Max: 98.9 (2018 17:18)  HR: 80 (2018 08:17) (77 - 83)  BP: 142/- (2018 08:17) (117/73 - 142/-)  BP(mean): --  RR: 17 (2018 08:17) (17 - 18)  SpO2: 97% (2018 08:17) (93% - 97%)  Daily     Daily     Appearance: Alert, responsive, in no acute distress.    Neurological: Sensation is grossly intact to light touch. 5/5 motor function of all extremities. No focal deficits or weaknesses found.    Skin: improving cellulitis from knee down to the ankle. no abrasions, ulcerations, bleeding, or other rashes noted.     Vascular: 2+ distal pulses. Cap refill < 2 sec.    Musculoskeletal:         Left Upper Extremity:       Right Upper Extremity:       Left Lower Extremity:       Right Lower Extremity: incision c/d/i, Prineo in place without wound dehiscence. improving erythema. full ROM knee without pain. EHL/FHL+ AT/GC+ DP Pulse 2+ brisk cap refill    A/P:  Pt is a  67y Female with right lower extremity cellulitis s/p total knee arthroplasty by Dr Ruff     PLAN:   * Continue IV ABX  * Monitor Cellulitis  * Pain Control  * DVT ppx  * Discussed with KR  * F/U Aspirate Cultures
BEAU [facility]    [id number]    History:  The patient has cellulitis right lower extremity s/p right tka, improving.  Patient is doing well. Denies knee pain. Ambulates on own. No new complaints.    Vital Signs Last 24 Hrs  T(C): 36.9 (05 Jul 2018 05:10), Max: 37.2 (04 Jul 2018 13:19)  T(F): 98.4 (05 Jul 2018 05:10), Max: 99 (04 Jul 2018 13:19)  HR: 84 (05 Jul 2018 05:10) (76 - 87)  BP: 130/74 (05 Jul 2018 05:10) (101/56 - 136/81)  BP(mean): --  RR: 18 (05 Jul 2018 05:10) (17 - 20)  SpO2: 97% (04 Jul 2018 21:47) (94% - 97%)  I&O's Summary                            11.2   8.0   )-----------( 387      ( 04 Jul 2018 07:49 )             35.0       Culture Results:   No growth at 1 day.  Culture in progress (07.03.18 @ 05:49)          MEDICATIONS  (STANDING):  amLODIPine   Tablet 5 milliGRAM(s) Oral daily  enoxaparin Injectable 40 milliGRAM(s) SubCutaneous every 24 hours  estrogens    conjugated 0.3 milliGRAM(s) Oral daily  folic acid 1 milliGRAM(s) Oral daily  levothyroxine 137 MICROGram(s) Oral daily  lisinopril 40 milliGRAM(s) Oral daily  loperamide 2 milliGRAM(s) Oral two times a day  pantoprazole    Tablet 40 milliGRAM(s) Oral before breakfast  saccharomyces boulardii 250 milliGRAM(s) Oral two times a day  sodium chloride 0.9% lock flush 3 milliLiter(s) IV Push every 8 hours  vancomycin  IVPB 1250 milliGRAM(s) IV Intermittent every 12 hours    MEDICATIONS  (PRN):  acetaminophen   Tablet. 650 milliGRAM(s) Oral every 6 hours PRN Mild Pain (1 - 3)  ALBUTerol/ipratropium for Nebulization 3 milliLiter(s) Nebulizer every 6 hours PRN Shortness of Breath and/or Wheezing  clonazePAM Tablet 0.5 milliGRAM(s) Oral three times a day PRN anxiety  morphine  - Injectable 4 milliGRAM(s) IV Push every 6 hours PRN breakthrough pain  ondansetron Injectable 4 milliGRAM(s) IV Push every 6 hours PRN Nausea  oxyCODONE    IR 5 milliGRAM(s) Oral four times a day PRN Moderate Pain (4 - 6)  oxyCODONE    IR 10 milliGRAM(s) Oral four times a day PRN Severe Pain (7 - 10)      Physical exam: Sitting on bed in NAD. Right lower extremity- Knee without effusion. Prineo intact, incision site dry. No erythema at knee. +AROM knee 0-100 without difficulty. Calf soft, nt. Mild erythema lower leg from ankle to mid lower leg. +ehl/fhl. Sensation intact. DP 2+.     Primary Orthopedic Assessment:  • Cellulitis RLE, improving  Secondary  Orthopedic Assessment(s):   • S/P right tka    Plan:   • DVT prophylaxis as prescribed, including use of compression devices and ankle pumps  • Continue physical therapy  • WBAT  • Pain control as clinically indicated  • Incentive spirometry encouraged  -F/U cultures  -Abx per ID  • Discharge planning – anticipated discharge is Home
BEAU [facility]    [id number]    History: 67y Female is status post R total knee arthroplasty 6/19/18 with RLE cellulitis. Patient is doing well and is comfortable. The patient's pain is controlled using the prescribed pain medications. The patient is participating in physical therapy. Denies nausea, vomiting, chest pain, shortness of breath, abdominal pain or fever. No new complaints.                            11.6   8.3   )-----------( 437      ( 06 Jul 2018 07:18 )             35.8             MEDICATIONS  (STANDING):  amLODIPine   Tablet 5 milliGRAM(s) Oral daily  enoxaparin Injectable 40 milliGRAM(s) SubCutaneous every 24 hours  estrogens    conjugated 0.3 milliGRAM(s) Oral daily  folic acid 1 milliGRAM(s) Oral daily  levothyroxine 137 MICROGram(s) Oral daily  lisinopril 40 milliGRAM(s) Oral daily  loperamide 2 milliGRAM(s) Oral two times a day  pantoprazole    Tablet 40 milliGRAM(s) Oral before breakfast  saccharomyces boulardii 250 milliGRAM(s) Oral two times a day  sodium chloride 0.9% lock flush 3 milliLiter(s) IV Push every 8 hours  vancomycin  IVPB 1250 milliGRAM(s) IV Intermittent every 12 hours    MEDICATIONS  (PRN):  acetaminophen   Tablet. 650 milliGRAM(s) Oral every 6 hours PRN Mild Pain (1 - 3)  ALBUTerol/ipratropium for Nebulization 3 milliLiter(s) Nebulizer every 6 hours PRN Shortness of Breath and/or Wheezing  clonazePAM Tablet 0.5 milliGRAM(s) Oral three times a day PRN anxiety  morphine  - Injectable 4 milliGRAM(s) IV Push every 6 hours PRN breakthrough pain  ondansetron Injectable 4 milliGRAM(s) IV Push every 6 hours PRN Nausea  oxyCODONE    IR 5 milliGRAM(s) Oral four times a day PRN Moderate Pain (4 - 6)  oxyCODONE    IR 10 milliGRAM(s) Oral four times a day PRN Severe Pain (7 - 10)      Physical exam: The Prineo dressing is clean, dry and intact. No drainage or discharge. Erythema is noted over R knee extending distally to the R ankle region. No blistering. No ecchymosis. The calf is supple nontender. Passive range of motion is acceptable to due postoperative pain. Sensation to light touch is grossly intact distally. Extensor hallucis longus and flexor hallucis longus are intact. No foot drop. 2+ dorsalis pedis pulse. Capillary refill is less than 2 seconds. No cyanosis.    Primary Orthopedic Assessment:  • 67y Female is status post R total knee arthroplasty 6/19/18 with RLE cellulitis.     Secondary  Orthopedic Assessment(s):   •     Secondary  Medical Assessment(s):   •     Plan:   • Pain control as clinically indicated  • DVT prophylaxis as prescribed, including use of compression devices and ankle pumps  • Continue physical therapy  • Weightbearing as tolerated of the right lower extremity with assistance of a walker  • Incentive spirometry encouraged  • abx as per ID, possible conversion to oral abx today  • Discharge planning – anticipated discharge is for home as per primary team
[facility]  INFECTIOUS DISEASES AND INTERNAL MEDICINE at [place]  =======================================================  Kenn Morejon MD  Diplomates American Board of Internal Medicine and Infectious Diseases  =======================================================    BAS, BEAU [phone]    Follow up: right knee cellulitis    Allergies:  banana, coconut, pineapple, mustard, vinegar, sulfite (Other)  Benadryl (Hives)  beta blockers (Anaphylaxis)  Ceclor (Hives)  Cipro (Hives)  dairy products (Unknown)  fentanyl (Hives)  Flagyl (Hives)  hydrocodone (Other)  hydromorphone (Other)  Keflex (Hives)  lactose (Unknown)  penicillin (Anaphylaxis)  Tree Nuts (Unknown)      Medications:  acetaminophen   Tablet. 650 milliGRAM(s) Oral every 6 hours PRN  ALBUTerol/ipratropium for Nebulization 3 milliLiter(s) Nebulizer every 6 hours PRN  amLODIPine   Tablet 5 milliGRAM(s) Oral daily  clonazePAM Tablet 0.5 milliGRAM(s) Oral three times a day PRN  enoxaparin Injectable 40 milliGRAM(s) SubCutaneous every 24 hours  estrogens    conjugated 0.3 milliGRAM(s) Oral daily  folic acid 1 milliGRAM(s) Oral daily  levothyroxine 137 MICROGram(s) Oral daily  lisinopril 40 milliGRAM(s) Oral daily  loperamide 2 milliGRAM(s) Oral two times a day  morphine  - Injectable 4 milliGRAM(s) IV Push every 6 hours PRN  ondansetron Injectable 4 milliGRAM(s) IV Push every 6 hours PRN  oxyCODONE    IR 5 milliGRAM(s) Oral four times a day PRN  oxyCODONE    IR 10 milliGRAM(s) Oral four times a day PRN  pantoprazole    Tablet 40 milliGRAM(s) Oral before breakfast  saccharomyces boulardii 250 milliGRAM(s) Oral two times a day  sodium chloride 0.9% lock flush 3 milliLiter(s) IV Push every 8 hours  vancomycin  IVPB 1250 milliGRAM(s) IV Intermittent every 12 hours    SOCIAL       FAMILY   FAMILY HISTORY:  Family history of diabetes mellitus  Family history of hypertension  Family history of coronary thrombosis (Sibling)  Family history of acute myocardial infarction  Family history of throat cancer    REVIEW OF SYSTEMS:  CONSTITUTIONAL:  No Fever or chills  HEENT:   No diplopia or blurred vision.  No earache, sore throat or runny nose.  CARDIOVASCULAR:  No pressure, squeezing, strangling, tightness, heaviness or aching about the chest, neck, axilla or epigastrium.  RESPIRATORY:  No cough, shortness of breath, PND or orthopnea.  GASTROINTESTINAL:  No nausea, vomiting or diarrhea.  GENITOURINARY:  No dysuria, frequency or urgency. No Blood in urine  MUSCULOSKELETAL:   AS PER HPI  SKIN:  No change in skin, hair or nails.  NEUROLOGIC:  No paresthesias, fasciculations, seizures or weakness.  PSYCHIATRIC:  No disorder of thought or mood.  ENDOCRINE:  No heat or cold intolerance, polyuria or polydipsia.  HEMATOLOGICAL:  No easy bruising or bleeding.            Physical Exam:  ICU Vital Signs Last 24 Hrs  T(C): 36.9 (2018 05:10), Max: 37.2 (2018 13:19)  T(F): 98.4 (2018 05:10), Max: 99 (2018 13:19)  HR: 84 (2018 05:10) (76 - 87)  BP: 130/74 (2018 05:10) (101/56 - 136/81)  BP(mean): --  ABP: --  ABP(mean): --  RR: 18 (2018 05:10) (17 - 20)  SpO2: 97% (2018 21:47) (94% - 97%)    GEN: NAD, pleasant  HEENT: normocephalic and atraumatic. EOMI. NANETTE.    NECK: Supple. No carotid bruits.  No lymphadenopathy or thyromegaly.  LUNGS: Clear to auscultation.  HEART: Regular rate and rhythm without murmur.  ABDOMEN: Soft, nontender, and nondistended.  Positive bowel sounds.    : No CVA tenderness  EXTREMITIES: MILD EDEMA AND ERYTHEMA LEFT KNEE NL ROM  MSK: no joint swelling  NEUROLOGIC: Cranial nerves II through XII are grossly intact.  PSYCHIATRIC: Appropriate affect .  SKIN: No ulceration or induration present.        Labs:                              11.2   8.0   )-----------( 387      ( 2018 07:49 )             35.0         Urinalysis Basic - ( 2018 10:02 )    Color: Yellow / Appearance: Clear / S.020 / pH: x  Gluc: x / Ketone: Negative  / Bili: Negative / Urobili: Negative mg/dL   Blood: x / Protein: Negative mg/dL / Nitrite: Negative   Leuk Esterase: Moderate / RBC: x / WBC 6-10   Sq Epi: x / Non Sq Epi: Occasional / Bacteria: Occasional            CAPILLARY BLOOD GLUCOSE            RECENT CULTURES:   @ 10:03 .Urine Clean Catch (Midstream)     <10,000 CFU/ml Gram Negative Rods  <10,000 CFU/ml Gram Positive Cocci in Clusters        - @ 05:49 .Body Fluid Synovial Fluid     No growth at 2 days.  Culture in progress    Moderate White blood cells  No organisms seen       @ 01:46 .Blood Blood-Peripheral     No growth at 48 hours
[facility]  INFECTIOUS DISEASES AND INTERNAL MEDICINE at [place]  =======================================================  Kenn Morejon MD  Diplomates American Board of Internal Medicine and Infectious Diseases  =======================================================    BAS, BEAU [phone]    Follow up: right knee cellulitis    Allergies:  banana, coconut, pineapple, mustard, vinegar, sulfite (Other)  Benadryl (Hives)  beta blockers (Anaphylaxis)  Ceclor (Hives)  Cipro (Hives)  dairy products (Unknown)  fentanyl (Hives)  Flagyl (Hives)  hydrocodone (Other)  hydromorphone (Other)  Keflex (Hives)  lactose (Unknown)  penicillin (Anaphylaxis)  Tree Nuts (Unknown)      Medications:  acetaminophen   Tablet. 650 milliGRAM(s) Oral every 6 hours PRN  ALBUTerol/ipratropium for Nebulization 3 milliLiter(s) Nebulizer every 6 hours PRN  amLODIPine   Tablet 5 milliGRAM(s) Oral daily  clonazePAM Tablet 0.5 milliGRAM(s) Oral three times a day PRN  enoxaparin Injectable 40 milliGRAM(s) SubCutaneous every 24 hours  estrogens    conjugated 0.3 milliGRAM(s) Oral daily  folic acid 1 milliGRAM(s) Oral daily  levothyroxine 137 MICROGram(s) Oral daily  lisinopril 40 milliGRAM(s) Oral daily  loperamide 2 milliGRAM(s) Oral two times a day  morphine  - Injectable 4 milliGRAM(s) IV Push every 6 hours PRN  ondansetron Injectable 4 milliGRAM(s) IV Push every 6 hours PRN  oxyCODONE    IR 5 milliGRAM(s) Oral four times a day PRN  oxyCODONE    IR 10 milliGRAM(s) Oral four times a day PRN  pantoprazole    Tablet 40 milliGRAM(s) Oral before breakfast  saccharomyces boulardii 250 milliGRAM(s) Oral two times a day  sodium chloride 0.9% lock flush 3 milliLiter(s) IV Push every 8 hours  vancomycin  IVPB 1250 milliGRAM(s) IV Intermittent every 12 hours    SOCIAL       FAMILY   FAMILY HISTORY:  Family history of diabetes mellitus  Family history of hypertension  Family history of coronary thrombosis (Sibling)  Family history of acute myocardial infarction  Family history of throat cancer    REVIEW OF SYSTEMS:  CONSTITUTIONAL:  No Fever or chills  HEENT:   No diplopia or blurred vision.  No earache, sore throat or runny nose.  CARDIOVASCULAR:  No pressure, squeezing, strangling, tightness, heaviness or aching about the chest, neck, axilla or epigastrium.  RESPIRATORY:  No cough, shortness of breath, PND or orthopnea.  GASTROINTESTINAL:  No nausea, vomiting or diarrhea.  GENITOURINARY:  No dysuria, frequency or urgency. No Blood in urine  MUSCULOSKELETAL:   AS PER HPI  SKIN:  No change in skin, hair or nails.  NEUROLOGIC:  No paresthesias, fasciculations, seizures or weakness.  PSYCHIATRIC:  No disorder of thought or mood.  ENDOCRINE:  No heat or cold intolerance, polyuria or polydipsia.  HEMATOLOGICAL:  No easy bruising or bleeding.            Physical Exam:   Vital Signs Last 24 Hrs  T(C): 36.7 (2018 06:57), Max: 36.9 (2018 16:40)  T(F): 98 (2018 06:57), Max: 98.4 (2018 16:40)  HR: 84 (2018 06:57) (81 - 100)  BP: 158/88 (2018 06:57) (135/79 - 166/88)  BP(mean): --  RR: 19 (2018 06:57) (18 - 20)  SpO2: 96% (2018 06:57) (96% - 98%)    GEN: NAD, pleasant  HEENT: normocephalic and atraumatic. EOMI. NANETTE.    NECK: Supple. No carotid bruits.  No lymphadenopathy or thyromegaly.  LUNGS: Clear to auscultation.  HEART: Regular rate and rhythm without murmur.  ABDOMEN: Soft, nontender, and nondistended.  Positive bowel sounds.    : No CVA tenderness  EXTREMITIES: MILD EDEMA AND ERYTHEMA LEFT KNEE NL ROM  MSK: no joint swelling  NEUROLOGIC: Cranial nerves II through XII are grossly intact.  PSYCHIATRIC: Appropriate affect .  SKIN: No ulceration or induration present.        Labs:                        11.6   8.3   )-----------( 437      ( 2018 07:18 )             35.8            Color: Yellow / Appearance: Clear / S.020 / pH: x  Gluc: x / Ketone: Negative  / Bili: Negative / Urobili: Negative mg/dL   Blood: x / Protein: Negative mg/dL / Nitrite: Negative   Leuk Esterase: Moderate / RBC: x / WBC 6-10   Sq Epi: x / Non Sq Epi: Occasional / Bacteria: Occasional            CAPILLARY BLOOD GLUCOSE            RECENT CULTURES:   @ 10:03 .Urine Clean Catch (Midstream)     <10,000 CFU/ml Gram Negative Rods  <10,000 CFU/ml Gram Positive Cocci in Clusters        - @ 05:49 .Body Fluid Synovial Fluid     No growth at 2 days.  Culture in progress    Moderate White blood cells  No organisms seen       @ 01:46 .Blood Blood-Peripheral     No growth at 48 hours
Patient: BEAU LOVE [id number] 67y Female                           Internal Medicine Hospitalist Progress Note  Chief Complaint: Patient is a 67y old  Female who presents with a chief complaint of right leg pain/redness (2018 05:13)    HPI:  68 y/o female s/p right TKA on  for DJD who had an uneventful surgery and post op course dcd home with home PT presents to ED c/o increasing right lower leg, and this redness, pain, and swelling. She called her PMD 4 days ago concerned about cellulitis and was sent for o/p U/S which was reported as negative for DVT. She was then placed on Biaxin which she has been taking for 4 days with no improvement. She admits to chills, but denies fever, SOB, CP, N/V. In the ED patient with evidence of RLE cellulitis, no septic criteria. Incision looks clean with no drainage and knee with goo ROM. She was noted to have pos. MRSA screen on previous hospitalization. She will be admitted for IV abx blood cultures. (2018 05:13)    Interim History:  Pt seen today with RN.  Notes RLE pain and redness is improving.  Initially reported allergy to Vancomycin, but now states she took unspecified pill antibiotic as outpatient ~, resulting in ? allergic reaction but no anaphylaxis.  No fever / chills.  No additional complaints.   My onservice on    7/3/2018    ____________________PHYSICAL EXAM:  Vitals reviewed as indicated below  GENERAL:  NAD Alert and Oriented x 3   HEENT: NCAT  CARDIOVASCULAR:  S1, S2  LUNGS: CTAB  ABDOMEN:  soft, (-) tenderness, (-) distension, (+) bowel sounds, (-) guarding, (-) rebound (-) rigidity  EXTREMITIES:  no cyanosis / clubbing.  R knee incision healing.  RLE mild erythema mid calf to medial thigh   ____________________      BACKGROUND:  HEALTH ISSUES - PROBLEM Dx:  Hypothyroidism, unspecified type: Hypothyroidism, unspecified type  Asthma, unspecified asthma severity, unspecified whether complicated, unspecified whether persistent: Asthma, unspecified asthma severity, unspecified whether complicated, unspecified whether persistent  Prediabetes: Prediabetes  Gastroesophageal reflux disease, esophagitis presence not specified: Gastroesophageal reflux disease, esophagitis presence not specified  Anxiety: Anxiety  Cellulitis of right lower extremity: Cellulitis of right lower extremity        Allergies    banana, coconut, pineapple, mustard, vinegar, sulfite (Other)  Benadryl (Hives)  beta blockers (Anaphylaxis)  Ceclor (Hives)  Cipro (Hives)  dairy products (Unknown)  fentanyl (Hives)  Flagyl (Hives)  hydrocodone (Other)  hydromorphone (Other)  Keflex (Hives)  lactose (Unknown)  penicillin (Anaphylaxis)  Tree Nuts (Unknown)    Intolerances      PAST MEDICAL & SURGICAL HISTORY:  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  Risk factors for obstructive sleep apnea  Alcoholism  UTI (urinary tract infection): frequent, last treated on 12/10/13 X 10 days with Bactrim, last cultured on  which was negative  Meniscus tear: left knee  Osteoarthritis  MVP (mitral valve prolapse)  Lactose intolerance  Fibromyalgia  Sjoegren syndrome  Lupus: discoid type  Chronic urticaria  Diverticulitis  Anxiety  H/O allergy: environmental, animal  GERD (gastroesophageal reflux disease)  IBS (irritable bowel syndrome)  Hypothyroidism: h/o Hashimoto Thyroiditis  HTN (hypertension)  Prediabetes  Asthma: well controlled    History of total left knee replacement  S/P lumpectomy of breast: right breast -benign  13  S/P colon resection: 13  Trauma: s/p multiple repair of uterine trauma secondary to an accident as a child with a broom  S/P D&C  Obesity  S/P cholecystectomy:   H/O: hysterectomy: LOUISE, BSO    S/P appendectomy:   H/O ovarian cystectomy        VITALS:  Vital Signs Last 24 Hrs  T(C): 36.6 (2018 13:23), Max: 36.8 (2018 22:59)  T(F): 97.8 (2018 13:23), Max: 98.3 (2018 08:51)  HR: 83 (2018 13:23) (68 - 83)  BP: 135/79 (2018 13:23) (104/66 - 136/78)  BP(mean): 97 (2018 05:13) (97 - 97)  RR: 18 (2018 13:23) (16 - 20)  SpO2: 96% (2018 13:23) (95% - 98%) Daily Height in cm: 160.02 (2018 22:59)    Daily   CAPILLARY BLOOD GLUCOSE      POCT Blood Glucose.: 112 mg/dL (2018 08:54)    I&O's Summary        LABS:                        11.3   8.6   )-----------( 438      ( 2018 01:37 )             35.3     07-03    140  |  98  |  6.0<L>  ----------------------------<  114  4.0   |  25.0  |  0.81    Ca    10.0      2018 01:37    TPro  7.9  /  Alb  4.0  /  TBili  0.4  /  DBili  x   /  AST  24  /  ALT  23  /  AlkPhos  90  07-03      LIVER FUNCTIONS - ( 2018 01:37 )  Alb: 4.0 g/dL / Pro: 7.9 g/dL / ALK PHOS: 90 U/L / ALT: 23 U/L / AST: 24 U/L / GGT: x                     MEDICATIONS:  MEDICATIONS  (STANDING):  amLODIPine   Tablet 5 milliGRAM(s) Oral daily  dextrose 5%. 1000 milliLiter(s) (50 mL/Hr) IV Continuous <Continuous>  dextrose 50% Injectable 12.5 Gram(s) IV Push once  enoxaparin Injectable 40 milliGRAM(s) SubCutaneous every 24 hours  folic acid 1 milliGRAM(s) Oral daily  insulin lispro (HumaLOG) corrective regimen sliding scale   SubCutaneous Before meals and at bedtime  levothyroxine 137 MICROGram(s) Oral daily  lisinopril 40 milliGRAM(s) Oral daily  pantoprazole    Tablet 40 milliGRAM(s) Oral before breakfast  saccharomyces boulardii 250 milliGRAM(s) Oral two times a day  sodium chloride 0.9% lock flush 3 milliLiter(s) IV Push every 8 hours  vancomycin  IVPB 1250 milliGRAM(s) IV Intermittent every 12 hours    MEDICATIONS  (PRN):  acetaminophen   Tablet. 650 milliGRAM(s) Oral every 6 hours PRN Mild Pain (1 - 3)  ALBUTerol/ipratropium for Nebulization 3 milliLiter(s) Nebulizer every 6 hours PRN Shortness of Breath and/or Wheezing  clonazePAM Tablet 0.5 milliGRAM(s) Oral three times a day PRN anxiety  dextrose 40% Gel 15 Gram(s) Oral once PRN Blood Glucose LESS THAN 70 milliGRAM(s)/deciliter  glucagon  Injectable 1 milliGRAM(s) IntraMuscular once PRN Glucose LESS THAN 70 milligrams/deciliter  morphine  - Injectable 4 milliGRAM(s) IV Push every 4 hours PRN Moderate Pain (4 - 6)  ondansetron Injectable 4 milliGRAM(s) IV Push every 6 hours PRN Nausea  oxyCODONE    5 mG/acetaminophen 325 mG 1 Tablet(s) Oral every 4 hours PRN Mild Pain (1 - 3)
Pt Name: BEAU LOVE    MRN: [id number]    Patient is a 67y Female presenting with worsening RLE cellulitis x 1 week. Pt is s/p right total knee arthroplasty with Dr. Ruff on 6/19. Pt states that she was doing well and 1 week ago developed redness of her right foot/ankle that became worse and redness traveled up her right leg to her calf. Pt states that she saw her PCP who placed her on Biaxin to treat for cellulitis. Pt also had ultrasound done at [facility] and states that DVT study was negative for acute DVT. Pt admits to minor improvement when she first began abx, but shortly thereafter her redness worsened and is now just above her knee. Pt states that she is able to move her knee without any difficulty and mild pain reported at the knee/leg. Pt otherwise denies recent trauma/injury, fevers/chills, c/p, sob, abdominal pain, n/v, numbness/tingling and has no other complaints.    Vital Signs Last 24 Hrs  T(C): 36.8 (03 Jul 2018 08:51), Max: 36.8 (02 Jul 2018 22:59)  T(F): 98.3 (03 Jul 2018 08:51), Max: 98.3 (03 Jul 2018 08:51)  HR: 79 (03 Jul 2018 08:51) (68 - 83)  BP: 104/66 (03 Jul 2018 08:51) (104/66 - 136/78)  BP(mean): 97 (03 Jul 2018 05:13) (97 - 97)  RR: 16 (03 Jul 2018 08:51) (16 - 20)  SpO2: 95% (03 Jul 2018 08:51) (95% - 98%)                          11.3   8.6   )-----------( 438      ( 03 Jul 2018 01:37 )             35.3     07-03    140  |  98  |  6.0<L>  ----------------------------<  114  4.0   |  25.0  |  0.81    Ca    10.0      03 Jul 2018 01:37    TPro  7.9  /  Alb  4.0  /  TBili  0.4  /  DBili  x   /  AST  24  /  ALT  23  /  AlkPhos  90  07-03    Cell Count, Body Fluid (07.03.18 @ 05:48)    Monocyte/Macrophage Count - Body Fluid: 9 %    Fluid Segmented Granulocytes: 68 %    Fluid Type: Synovial fluid Right Knee    Body Fluid Appearance: Bloody    BF Lymphocytes: 22 %    Eosinophil Count - Body Fluid: 1 %    Tube Type: Lavender    Color - Body Fluid: Red    Total Nucleated Cell Count, Body Fluid: 1655 /uL    Total RBC Count: 49871 /uL    A:  Pt is a  67y Female s/p Right total knee arthroplasty on 6/19 with Dr. Ruff found to have RLE cellulitis    PLAN discussed with Dr. Ruff:     * No surgical intervention required  * IV Abx  * PT- WBAT of the RLE  * DVTP  * Ortho to follow
seen for RT knee cellulitis    improving swelling and erythema of leg   ros otherwise negative  no acute complaints    MEDICATIONS  (STANDING):  amLODIPine   Tablet 5 milliGRAM(s) Oral daily  enoxaparin Injectable 40 milliGRAM(s) SubCutaneous every 24 hours  folic acid 1 milliGRAM(s) Oral daily  levothyroxine 137 MICROGram(s) Oral daily  lisinopril 40 milliGRAM(s) Oral daily  pantoprazole    Tablet 40 milliGRAM(s) Oral before breakfast  saccharomyces boulardii 250 milliGRAM(s) Oral two times a day  sodium chloride 0.9% lock flush 3 milliLiter(s) IV Push every 8 hours  vancomycin  IVPB 1250 milliGRAM(s) IV Intermittent every 12 hours    MEDICATIONS  (PRN):  acetaminophen   Tablet. 650 milliGRAM(s) Oral every 6 hours PRN Mild Pain (1 - 3)  ALBUTerol/ipratropium for Nebulization 3 milliLiter(s) Nebulizer every 6 hours PRN Shortness of Breath and/or Wheezing  clonazePAM Tablet 0.5 milliGRAM(s) Oral three times a day PRN anxiety  morphine  - Injectable 4 milliGRAM(s) IV Push every 4 hours PRN Moderate Pain (4 - 6)  ondansetron Injectable 4 milliGRAM(s) IV Push every 6 hours PRN Nausea  oxyCODONE    5 mG/acetaminophen 325 mG 1 Tablet(s) Oral every 4 hours PRN Mild Pain (1 - 3)      Allergies    banana, coconut, pineapple, mustard, vinegar, sulfite (Other)  Benadryl (Hives)  beta blockers (Anaphylaxis)  Ceclor (Hives)  Cipro (Hives)  dairy products (Unknown)  fentanyl (Hives)  Flagyl (Hives)  hydrocodone (Other)  hydromorphone (Other)  Keflex (Hives)  lactose (Unknown)  penicillin (Anaphylaxis)  Tree Nuts (Unknown)      Vital Signs Last 24 Hrs  T(C): 36.9 (2018 08:17), Max: 37.2 (2018 17:18)  T(F): 98.4 (2018 08:17), Max: 98.9 (2018 17:18)  HR: 80 (2018 08:17) (77 - 83)  BP: 142/- (2018 08:17) (117/73 - 142/-)  BP(mean): --  RR: 17 (2018 08:17) (17 - 18)  SpO2: 97% (2018 08:17) (93% - 97%)    PHYSICAL EXAM:    GENERAL: NAD   CHEST/LUNG: Clear to percussion bilaterall  HEART: Regular rate and rhythm; S1 S2  ABDOMEN: Soft, Nontender, Nondistended; Bowel sounds present  EXTREMITIES: trace edema LLE   RLE +1 edema, mild warmth and erythema knee:  incision site c/d/i no discharge.  rom intact  limited by pain  NERVOUS SYSTEM:  Alert & Oriented X3, nonfocal    LABS:                        11.2   8.0   )-----------( 387      ( 2018 07:49 )             35.0     07-03    140  |  98  |  6.0<L>  ----------------------------<  114  4.0   |  25.0  |  0.81    Ca    10.0      2018 01:37    TPro  7.9  /  Alb  4.0  /  TBili  0.4  /  DBili  x   /  AST  24  /  ALT  23  /  AlkPhos  90  07-03      Urinalysis Basic - ( 2018 10:02 )    Color: Yellow / Appearance: Clear / S.020 / pH: x  Gluc: x / Ketone: Negative  / Bili: Negative / Urobili: Negative mg/dL   Blood: x / Protein: Negative mg/dL / Nitrite: Negative   Leuk Esterase: Moderate / RBC: x / WBC 6-10   Sq Epi: x / Non Sq Epi: Occasional / Bacteria: Occasional        CAPILLARY BLOOD GLUCOSE      POCT Blood Glucose.: 107 mg/dL (2018 22:40)  POCT Blood Glucose.: 105 mg/dL (2018 17:27)        RADIOLOGY & ADDITIONAL TESTS:
seen for cellulitis    states not ready to go home until cultures finalized (explained thus far negative)  pain ok but requiring IV morphine--advised to use oxycodone as she does at home  ros otherwise negative     MEDICATIONS  (STANDING):  amLODIPine   Tablet 5 milliGRAM(s) Oral daily  enoxaparin Injectable 40 milliGRAM(s) SubCutaneous every 24 hours  folic acid 1 milliGRAM(s) Oral daily  levothyroxine 137 MICROGram(s) Oral daily  lisinopril 40 milliGRAM(s) Oral daily  loperamide 2 milliGRAM(s) Oral two times a day  pantoprazole    Tablet 40 milliGRAM(s) Oral before breakfast  saccharomyces boulardii 250 milliGRAM(s) Oral two times a day  sodium chloride 0.9% lock flush 3 milliLiter(s) IV Push every 8 hours  vancomycin  IVPB 1250 milliGRAM(s) IV Intermittent every 12 hours    MEDICATIONS  (PRN):  acetaminophen   Tablet. 650 milliGRAM(s) Oral every 6 hours PRN Mild Pain (1 - 3)  ALBUTerol/ipratropium for Nebulization 3 milliLiter(s) Nebulizer every 6 hours PRN Shortness of Breath and/or Wheezing  clonazePAM Tablet 0.5 milliGRAM(s) Oral three times a day PRN anxiety  morphine  - Injectable 4 milliGRAM(s) IV Push every 6 hours PRN breakthrough pain  ondansetron Injectable 4 milliGRAM(s) IV Push every 6 hours PRN Nausea  oxyCODONE    IR 5 milliGRAM(s) Oral four times a day PRN Moderate Pain (4 - 6)  oxyCODONE    IR 10 milliGRAM(s) Oral four times a day PRN Severe Pain (7 - 10)      Allergies    banana, coconut, pineapple, mustard, vinegar, sulfite (Other)  Benadryl (Hives)  beta blockers (Anaphylaxis)  Ceclor (Hives)  Cipro (Hives)  dairy products (Unknown)  fentanyl (Hives)  Flagyl (Hives)  hydrocodone (Other)  hydromorphone (Other)  Keflex (Hives)  lactose (Unknown)  penicillin (Anaphylaxis)  Tree Nuts (Unknown)    Vital Signs Last 24 Hrs  T(C): 36.9 (2018 05:10), Max: 37.2 (2018 13:19)  T(F): 98.4 (2018 05:10), Max: 99 (2018 13:19)  HR: 84 (2018 05:10) (76 - 87)  BP: 130/74 (2018 05:10) (101/56 - 136/81)  BP(mean): --  RR: 18 (2018 05:10) (17 - 20)  SpO2: 97% (2018 21:47) (94% - 97%)    PHYSICAL EXAM:    GENERAL: NAD  CHEST/LUNG: Clear to percussion bilaterally  HEART: Regular rate and rhythm; S1 S2  ABDOMEN: Soft, Nontender, Nondistended; Bowel sounds present  EXTREMITIES:  trace LLE, RLE : c/d/i incision site no discharge. no erythema. + swelling.  NEURO: nonfocal    LABS:                        11.2   8.0   )-----------( 387      ( 2018 07:49 )             35.0             Urinalysis Basic - ( 2018 10:02 )    Color: Yellow / Appearance: Clear / S.020 / pH: x  Gluc: x / Ketone: Negative  / Bili: Negative / Urobili: Negative mg/dL   Blood: x / Protein: Negative mg/dL / Nitrite: Negative   Leuk Esterase: Moderate / RBC: x / WBC 6-10   Sq Epi: x / Non Sq Epi: Occasional / Bacteria: Occasional        CAPILLARY BLOOD GLUCOSE            RADIOLOGY & ADDITIONAL TESTS:

## 2018-07-06 NOTE — PROGRESS NOTE ADULT - ASSESSMENT
68 y/o female s/p right TKA on 6/19 for DJD who had an uneventful surgery and post op course dcd home with home PT presents to ED c/o increasing right lower leg, and this redness, pain, and swelling. She called her PMD 4 days ago concerned about cellulitis and was sent for o/p U/S which was reported as negative for DVT. She was then placed on Biaxin which she has been taking for 4 days with no improvement. She admits to chills, but denies fever, SOB, CP, N/V. In the ED patient with evidence of RLE cellulitis, no septic criteria. Incision looks clean with no drainage and knee with goo ROM. She was noted to have pos. MRSA screen on previous hospitalization.  BLOOD CX ARE SO FOR NEGATIVE  FLUID ASPIRATE     NEGATIVE to  DATE    OK     TO  CHANGE ORAL    ABX  AND D/C ON BACTRIM DS BID FOR 1 WEEK  WILL FOLLOW UP AS NEEDED PLEASE CALL IF [facility]   D/W HOSPITALIST

## 2018-07-06 NOTE — PROGRESS NOTE ADULT - PROVIDER SPECIALTY LIST ADULT
Hospitalist
Infectious Disease
Infectious Disease
Orthopedics

## 2018-07-08 LAB
CULTURE RESULTS: SIGNIFICANT CHANGE UP
SPECIMEN SOURCE: SIGNIFICANT CHANGE UP

## 2018-07-09 ENCOUNTER — EMERGENCY (EMERGENCY)
Facility: HOSPITAL | Age: 68
LOS: 1 days | Discharge: HOME | End: 2018-07-09
Attending: EMERGENCY MEDICINE
Payer: MEDICARE

## 2018-07-09 VITALS
DIASTOLIC BLOOD PRESSURE: 76 MMHG | TEMPERATURE: 98 F | RESPIRATION RATE: 20 BRPM | OXYGEN SATURATION: 96 % | HEART RATE: 78 BPM | SYSTOLIC BLOOD PRESSURE: 146 MMHG

## 2018-07-09 VITALS
DIASTOLIC BLOOD PRESSURE: 77 MMHG | HEART RATE: 83 BPM | SYSTOLIC BLOOD PRESSURE: 117 MMHG | RESPIRATION RATE: 20 BRPM | WEIGHT: 255.07 LBS | TEMPERATURE: 98 F | HEIGHT: 63 IN | OXYGEN SATURATION: 95 %

## 2018-07-09 DIAGNOSIS — O03.9 COMPLETE OR UNSPECIFIED SPONTANEOUS ABORTION WITHOUT COMPLICATION: Chronic | ICD-10-CM

## 2018-07-09 DIAGNOSIS — Z96.652 PRESENCE OF LEFT ARTIFICIAL KNEE JOINT: Chronic | ICD-10-CM

## 2018-07-09 LAB
ANION GAP SERPL CALC-SCNC: 15 MMOL/L — SIGNIFICANT CHANGE UP (ref 5–17)
APPEARANCE UR: CLEAR — SIGNIFICANT CHANGE UP
BACTERIA # UR AUTO: ABNORMAL
BASOPHILS # BLD AUTO: 0.1 K/UL — SIGNIFICANT CHANGE UP (ref 0–0.2)
BASOPHILS NFR BLD AUTO: 1.5 % — SIGNIFICANT CHANGE UP (ref 0–2)
BILIRUB UR-MCNC: NEGATIVE — SIGNIFICANT CHANGE UP
BUN SERPL-MCNC: 8 MG/DL — SIGNIFICANT CHANGE UP (ref 8–20)
CALCIUM SERPL-MCNC: 10.1 MG/DL — SIGNIFICANT CHANGE UP (ref 8.6–10.2)
CHLORIDE SERPL-SCNC: 98 MMOL/L — SIGNIFICANT CHANGE UP (ref 98–107)
CO2 SERPL-SCNC: 23 MMOL/L — SIGNIFICANT CHANGE UP (ref 22–29)
COLOR SPEC: YELLOW — SIGNIFICANT CHANGE UP
CREAT SERPL-MCNC: 0.82 MG/DL — SIGNIFICANT CHANGE UP (ref 0.5–1.3)
DIFF PNL FLD: NEGATIVE — SIGNIFICANT CHANGE UP
EOSINOPHIL # BLD AUTO: 0.3 K/UL — SIGNIFICANT CHANGE UP (ref 0–0.5)
EOSINOPHIL NFR BLD AUTO: 4.1 % — SIGNIFICANT CHANGE UP (ref 0–6)
EPI CELLS # UR: SIGNIFICANT CHANGE UP
GLUCOSE SERPL-MCNC: 97 MG/DL — SIGNIFICANT CHANGE UP (ref 70–115)
GLUCOSE UR QL: NEGATIVE MG/DL — SIGNIFICANT CHANGE UP
HCT VFR BLD CALC: 36.9 % — LOW (ref 37–47)
HGB BLD-MCNC: 11.9 G/DL — LOW (ref 12–16)
KETONES UR-MCNC: NEGATIVE — SIGNIFICANT CHANGE UP
LEUKOCYTE ESTERASE UR-ACNC: ABNORMAL
LYMPHOCYTES # BLD AUTO: 1.4 K/UL — SIGNIFICANT CHANGE UP (ref 1–4.8)
LYMPHOCYTES # BLD AUTO: 17.1 % — LOW (ref 20–55)
MCHC RBC-ENTMCNC: 28.5 PG — SIGNIFICANT CHANGE UP (ref 27–31)
MCHC RBC-ENTMCNC: 32.2 G/DL — SIGNIFICANT CHANGE UP (ref 32–36)
MCV RBC AUTO: 88.5 FL — SIGNIFICANT CHANGE UP (ref 81–99)
MONOCYTES # BLD AUTO: 0.7 K/UL — SIGNIFICANT CHANGE UP (ref 0–0.8)
MONOCYTES NFR BLD AUTO: 8.4 % — SIGNIFICANT CHANGE UP (ref 3–10)
NEUTROPHILS # BLD AUTO: 5.4 K/UL — SIGNIFICANT CHANGE UP (ref 1.8–8)
NEUTROPHILS NFR BLD AUTO: 68.5 % — SIGNIFICANT CHANGE UP (ref 37–73)
NITRITE UR-MCNC: NEGATIVE — SIGNIFICANT CHANGE UP
PH UR: 6.5 — SIGNIFICANT CHANGE UP (ref 5–8)
PLATELET # BLD AUTO: 415 K/UL — HIGH (ref 150–400)
POTASSIUM SERPL-MCNC: 4.2 MMOL/L — SIGNIFICANT CHANGE UP (ref 3.5–5.3)
POTASSIUM SERPL-SCNC: 4.2 MMOL/L — SIGNIFICANT CHANGE UP (ref 3.5–5.3)
PROT UR-MCNC: NEGATIVE MG/DL — SIGNIFICANT CHANGE UP
RBC # BLD: 4.17 M/UL — LOW (ref 4.4–5.2)
RBC # FLD: 15.1 % — SIGNIFICANT CHANGE UP (ref 11–15.6)
RBC CASTS # UR COMP ASSIST: SIGNIFICANT CHANGE UP /HPF (ref 0–4)
SODIUM SERPL-SCNC: 136 MMOL/L — SIGNIFICANT CHANGE UP (ref 135–145)
SP GR SPEC: 1 — LOW (ref 1.01–1.02)
UROBILINOGEN FLD QL: NEGATIVE MG/DL — SIGNIFICANT CHANGE UP
WBC # BLD: 8 K/UL — SIGNIFICANT CHANGE UP (ref 4.8–10.8)
WBC # FLD AUTO: 8 K/UL — SIGNIFICANT CHANGE UP (ref 4.8–10.8)
WBC UR QL: SIGNIFICANT CHANGE UP

## 2018-07-09 PROCEDURE — 99283 EMERGENCY DEPT VISIT LOW MDM: CPT

## 2018-07-09 PROCEDURE — 81001 URINALYSIS AUTO W/SCOPE: CPT

## 2018-07-09 PROCEDURE — 36415 COLL VENOUS BLD VENIPUNCTURE: CPT

## 2018-07-09 PROCEDURE — 80048 BASIC METABOLIC PNL TOTAL CA: CPT

## 2018-07-09 PROCEDURE — 87086 URINE CULTURE/COLONY COUNT: CPT

## 2018-07-09 PROCEDURE — 99284 EMERGENCY DEPT VISIT MOD MDM: CPT

## 2018-07-09 PROCEDURE — 85027 COMPLETE CBC AUTOMATED: CPT

## 2018-07-09 NOTE — ED PROVIDER NOTE - SKIN, MLM
Skin normal color for race, warm, dry and intact. Surgical scar over R knee, erythema noted. No discharge noted.

## 2018-07-09 NOTE — ED PROVIDER NOTE - PROGRESS NOTE DETAILS
Pt. re-evaluated. Pt.'s BP improving. Spoke to her cardiologist-Dr. España. He states that pt. may hold her BP meds until she follows up in the office. They will get the patient in early this week. This was explained to the patient. I also spoke to the Ortho PA and I was told that the patient's cultures were negative. Pt. has a follow up appointment with Dr. Ruff this week.

## 2018-07-09 NOTE — ED ADULT NURSE NOTE - ADDITIONAL PRINTED INSTRUCTIONS GIVEN
pt d/c in stable condition, no apparent distress noted at this time. Pt able to ambulate with steady gait with cane. Pt understands to followup with their PMD. Patient aware to stop BP meds until cards on Wed.

## 2018-07-09 NOTE — ED PROVIDER NOTE - MEDICAL DECISION MAKING DETAILS
Pt with dizziness, hypotension, may be medication related, will check labs, UA consult cardiology, ortho and reassess.

## 2018-07-09 NOTE — ED PROVIDER NOTE - OBJECTIVE STATEMENT
66 y/o F pt with a hx of HTN, s/p total knee replacement June 19th presents to the ED BIBA c/o hypotension. Was on Biaxin for cellulitis after her surgery because her RLE was red, hot and swollen. Symptoms were not alleviating. Was admitted to ED for IV antibiotics (Vancomycin) and was discharged x3 days ago. Was told to take bactrim PO, currently still on it. States her leg is still red and swollen but is better. States that in hospital he diastolic pressure was high but not too high. Over the weekend she states that her pressure has been fluctuating, pressure yesterday was normal,  but today pt states she left dizzy, lightheaded, low energy checked her pressure and it was 99/41, 89/57 at home. Decided to call the ambulance. States that she believes that while admitted, the change in medications caused her blood pressure to fluctuate. Currently on 5mg amlodipine, 40mg lisinopril both BID which she did not take this weekend because her pressure was too low. Is suppose to present to her cardiologist Wednesday but could not wait due to her symptoms and pressure. Mentions shes been having burning urination, and increased urinary frequency. Denies hx of blood pressure fluctuation. Denies fever, chest pain, SOB, N/V/D. No further complaints at this time.   Cardio: [facility]

## 2018-07-09 NOTE — ED ADULT NURSE NOTE - OBJECTIVE STATEMENT
Assumed patient care @ 1500. Pt received sitting on stretcher in no apparent distress. Pt AOx3 C/O having low BP @ home 95/41. Patient reports being d/c 3 days ago for Cellulitis in the RLE on oral ABX. Patient with redness to the RLE and she has a followup appt on Wed with Ortho, s/p total knee replacement. Steri-Strips in place. Patient complains of "having swollen vaginal lips and might have an infection". She reports dizziness and lightheadedness. Patient ambulated without difficulty to bathroom. Lungs clear to ausculation, respirations even unlabored. Denies Nausea, Vomiting, Diarrhea. Skin warm, dry, color appropriate for age and race except RLE.

## 2018-07-10 LAB
CULTURE RESULTS: SIGNIFICANT CHANGE UP
SPECIMEN SOURCE: SIGNIFICANT CHANGE UP

## 2018-07-11 ENCOUNTER — APPOINTMENT (OUTPATIENT)
Dept: ORTHOPEDIC SURGERY | Facility: CLINIC | Age: 68
End: 2018-07-11
Payer: MEDICARE

## 2018-07-11 VITALS
SYSTOLIC BLOOD PRESSURE: 128 MMHG | HEIGHT: 63.5 IN | BODY MASS INDEX: 46.2 KG/M2 | TEMPERATURE: 97.8 F | WEIGHT: 264 LBS | HEART RATE: 94 BPM | DIASTOLIC BLOOD PRESSURE: 84 MMHG

## 2018-07-11 PROCEDURE — 73562 X-RAY EXAM OF KNEE 3: CPT | Mod: RT

## 2018-07-11 PROCEDURE — 99024 POSTOP FOLLOW-UP VISIT: CPT

## 2018-07-12 ENCOUNTER — OTHER (OUTPATIENT)
Age: 68
End: 2018-07-12

## 2018-07-26 PROBLEM — K22.9 DISEASE OF ESOPHAGUS, UNSPECIFIED: Chronic | Status: ACTIVE | Noted: 2018-06-05

## 2018-07-26 PROBLEM — Z91.89 OTHER SPECIFIED PERSONAL RISK FACTORS, NOT ELSEWHERE CLASSIFIED: Chronic | Status: ACTIVE | Noted: 2018-06-05

## 2018-07-26 PROBLEM — F10.20 ALCOHOL DEPENDENCE, UNCOMPLICATED: Chronic | Status: ACTIVE | Noted: 2018-06-05

## 2018-08-07 ENCOUNTER — APPOINTMENT (OUTPATIENT)
Dept: ORTHOPEDIC SURGERY | Facility: CLINIC | Age: 68
End: 2018-08-07
Payer: MEDICARE

## 2018-08-07 VITALS
HEART RATE: 94 BPM | HEIGHT: 63.5 IN | SYSTOLIC BLOOD PRESSURE: 155 MMHG | WEIGHT: 246 LBS | DIASTOLIC BLOOD PRESSURE: 102 MMHG | BODY MASS INDEX: 43.05 KG/M2

## 2018-08-07 PROCEDURE — 99024 POSTOP FOLLOW-UP VISIT: CPT

## 2018-08-07 PROCEDURE — 73562 X-RAY EXAM OF KNEE 3: CPT | Mod: RT

## 2018-09-12 ENCOUNTER — APPOINTMENT (OUTPATIENT)
Dept: ORTHOPEDIC SURGERY | Facility: CLINIC | Age: 68
End: 2018-09-12
Payer: MEDICARE

## 2018-09-12 VITALS
BODY MASS INDEX: 43.05 KG/M2 | HEIGHT: 63.5 IN | SYSTOLIC BLOOD PRESSURE: 157 MMHG | DIASTOLIC BLOOD PRESSURE: 95 MMHG | WEIGHT: 246 LBS | HEART RATE: 80 BPM

## 2018-09-12 PROCEDURE — 73562 X-RAY EXAM OF KNEE 3: CPT | Mod: RT

## 2018-09-12 PROCEDURE — 99024 POSTOP FOLLOW-UP VISIT: CPT

## 2018-09-17 ENCOUNTER — FORM ENCOUNTER (OUTPATIENT)
Age: 68
End: 2018-09-17

## 2018-11-19 ENCOUNTER — APPOINTMENT (OUTPATIENT)
Dept: ENDOCRINOLOGY | Facility: CLINIC | Age: 68
End: 2018-11-19
Payer: MEDICARE

## 2018-11-19 VITALS
DIASTOLIC BLOOD PRESSURE: 90 MMHG | HEART RATE: 88 BPM | HEIGHT: 63.5 IN | SYSTOLIC BLOOD PRESSURE: 140 MMHG | WEIGHT: 250 LBS | BODY MASS INDEX: 43.75 KG/M2

## 2018-11-19 PROCEDURE — 99214 OFFICE O/P EST MOD 30 MIN: CPT

## 2018-11-19 RX ORDER — KETOCONAZOLE 20 MG/G
2 CREAM TOPICAL
Qty: 60 | Refills: 0 | Status: DISCONTINUED | COMMUNITY
Start: 2016-05-04 | End: 2018-11-19

## 2018-11-19 RX ORDER — SULFAMETHOXAZOLE AND TRIMETHOPRIM 800; 160 MG/1; MG/1
800-160 TABLET ORAL
Qty: 10 | Refills: 0 | Status: DISCONTINUED | COMMUNITY
Start: 2018-07-12 | End: 2018-11-19

## 2018-11-19 RX ORDER — ALPRAZOLAM 1 MG/1
1 TABLET ORAL
Qty: 30 | Refills: 0 | Status: DISCONTINUED | COMMUNITY
Start: 2017-02-03 | End: 2018-11-19

## 2018-11-19 RX ORDER — LORAZEPAM 1 MG/1
1 TABLET ORAL
Qty: 90 | Refills: 0 | Status: DISCONTINUED | COMMUNITY
Start: 2017-01-23 | End: 2018-11-19

## 2018-11-19 RX ORDER — SULFAMETHOXAZOLE AND TRIMETHOPRIM 800; 160 MG/1; MG/1
800-160 TABLET ORAL TWICE DAILY
Qty: 10 | Refills: 0 | Status: DISCONTINUED | COMMUNITY
Start: 2018-07-11 | End: 2018-11-19

## 2018-11-19 RX ORDER — CLONAZEPAM 2 MG/1
2 TABLET ORAL
Qty: 90 | Refills: 0 | Status: DISCONTINUED | COMMUNITY
Start: 2016-12-14 | End: 2018-11-19

## 2018-11-19 RX ORDER — OXYCODONE 5 MG/1
5 TABLET ORAL
Qty: 60 | Refills: 0 | Status: DISCONTINUED | COMMUNITY
Start: 2018-07-11 | End: 2018-11-19

## 2018-11-19 RX ORDER — PREDNISONE 50 MG/1
50 TABLET ORAL
Qty: 3 | Refills: 0 | Status: DISCONTINUED | COMMUNITY
Start: 2016-10-22 | End: 2018-11-19

## 2018-11-19 RX ORDER — SULFAMETHOXAZOLE AND TRIMETHOPRIM 800; 160 MG/1; MG/1
800-160 TABLET ORAL
Qty: 14 | Refills: 0 | Status: DISCONTINUED | COMMUNITY
Start: 2016-04-04 | End: 2018-11-19

## 2018-11-19 RX ORDER — CLARITHROMYCIN 500 MG/1
500 TABLET, FILM COATED ORAL
Qty: 14 | Refills: 0 | Status: DISCONTINUED | COMMUNITY
Start: 2016-10-31 | End: 2018-11-19

## 2018-11-19 RX ORDER — MUPIROCIN 20 MG/G
2 OINTMENT TOPICAL
Qty: 22 | Refills: 0 | Status: DISCONTINUED | COMMUNITY
Start: 2017-11-13 | End: 2018-11-19

## 2018-11-19 RX ORDER — METHYLPREDNISOLONE 4 MG/1
4 TABLET ORAL
Qty: 21 | Refills: 0 | Status: DISCONTINUED | COMMUNITY
Start: 2016-10-31 | End: 2018-11-19

## 2018-11-19 RX ORDER — HYDROCORTISONE VALERATE 2 MG/G
0.2 CREAM TOPICAL
Qty: 60 | Refills: 0 | Status: DISCONTINUED | COMMUNITY
Start: 2016-11-02 | End: 2018-11-19

## 2018-11-19 RX ORDER — LEVOTHYROXINE SODIUM 0.15 MG/1
150 TABLET ORAL DAILY
Qty: 1 | Refills: 3 | Status: DISCONTINUED | COMMUNITY
Start: 2017-04-26 | End: 2018-11-19

## 2018-11-30 ENCOUNTER — APPOINTMENT (OUTPATIENT)
Dept: DERMATOLOGY | Facility: CLINIC | Age: 68
End: 2018-11-30

## 2018-12-28 ENCOUNTER — APPOINTMENT (OUTPATIENT)
Dept: SURGICAL ONCOLOGY | Facility: CLINIC | Age: 68
End: 2018-12-28

## 2019-03-04 ENCOUNTER — APPOINTMENT (OUTPATIENT)
Dept: GASTROENTEROLOGY | Facility: CLINIC | Age: 69
End: 2019-03-04
Payer: MEDICARE

## 2019-03-04 VITALS
WEIGHT: 255 LBS | BODY MASS INDEX: 44.62 KG/M2 | HEIGHT: 63.5 IN | HEART RATE: 81 BPM | DIASTOLIC BLOOD PRESSURE: 86 MMHG | SYSTOLIC BLOOD PRESSURE: 136 MMHG

## 2019-03-04 DIAGNOSIS — R13.10 DYSPHAGIA, UNSPECIFIED: ICD-10-CM

## 2019-03-04 DIAGNOSIS — K21.0 GASTRO-ESOPHAGEAL REFLUX DISEASE WITH ESOPHAGITIS: ICD-10-CM

## 2019-03-04 DIAGNOSIS — M35.00 SICCA SYNDROME, UNSPECIFIED: ICD-10-CM

## 2019-03-04 DIAGNOSIS — Z80.0 FAMILY HISTORY OF MALIGNANT NEOPLASM OF DIGESTIVE ORGANS: ICD-10-CM

## 2019-03-04 DIAGNOSIS — K57.30 DIVERTICULOSIS OF LARGE INTESTINE W/OUT PERFORATION OR ABSCESS W/OUT BLEEDING: ICD-10-CM

## 2019-03-04 DIAGNOSIS — G90.01 CAROTID SINUS SYNCOPE: ICD-10-CM

## 2019-03-04 DIAGNOSIS — K58.9 IRRITABLE BOWEL SYNDROME W/OUT DIARRHEA: ICD-10-CM

## 2019-03-04 DIAGNOSIS — F10.10 ALCOHOL ABUSE, UNCOMPLICATED: ICD-10-CM

## 2019-03-04 PROCEDURE — 99215 OFFICE O/P EST HI 40 MIN: CPT

## 2019-03-04 PROCEDURE — 82274 ASSAY TEST FOR BLOOD FECAL: CPT | Mod: QW

## 2019-03-04 RX ORDER — LISINOPRIL 20 MG/1
20 TABLET ORAL
Qty: 270 | Refills: 0 | Status: DISCONTINUED | COMMUNITY
Start: 2016-05-31 | End: 2019-03-04

## 2019-03-05 PROBLEM — R13.10 ESOPHAGEAL DYSPHAGIA: Status: ACTIVE | Noted: 2019-03-05

## 2019-03-05 PROBLEM — F10.10 ALCOHOL ABUSE: Status: ACTIVE | Noted: 2019-03-05

## 2019-03-05 PROBLEM — K21.0 REFLUX ESOPHAGITIS: Status: ACTIVE | Noted: 2019-03-05

## 2019-03-05 PROBLEM — G90.01 CAROTIDYNIA: Status: ACTIVE | Noted: 2019-03-05

## 2019-03-05 PROBLEM — K58.9 IBS (IRRITABLE BOWEL SYNDROME): Status: ACTIVE | Noted: 2019-03-05

## 2019-03-05 RX ORDER — ALBUTEROL 90 MCG
AEROSOL (GRAM) INHALATION
Refills: 0 | Status: ACTIVE | COMMUNITY

## 2019-03-05 RX ORDER — CHROMIUM 200 MCG
TABLET ORAL
Refills: 0 | Status: ACTIVE | COMMUNITY

## 2019-03-05 RX ORDER — LEVALBUTEROL TARTRATE 45 UG/1
AEROSOL, METERED ORAL
Refills: 0 | Status: ACTIVE | COMMUNITY

## 2019-03-05 RX ORDER — HYDROCHLOROTHIAZIDE 12.5 MG/1
TABLET ORAL
Refills: 0 | Status: ACTIVE | COMMUNITY

## 2019-03-05 RX ORDER — LOPERAMIDE HCL 2 MG
CAPSULE ORAL
Refills: 0 | Status: ACTIVE | COMMUNITY

## 2019-03-05 NOTE — HISTORY OF PRESENT ILLNESS
[Heartburn] : stable heartburn [Nausea] : denies nausea [Vomiting] : denies vomiting [Diarrhea] : stable diarrhea [Constipation] : stable constipation [Yellow Skin Or Eyes (Jaundice)] : denies jaundice [Abdominal Pain] : stable abdominal pain [Abdominal Swelling] : denies abdominal swelling [Rectal Pain] : denies rectal pain [Test: ___] : [unfilled] [_________] : Performed [unfilled] [de-identified] : Renée presents to the office today for follow up with complaints of dysphagia.  She was last seen in the office in September 2017.\par \par Since her last visit, she had seen a dentist and was cleared to undergo an EGD for evaluation of dysphagia.  However, after having knee surgery in January 2018, she resumed drinking heavily again.  She has also been suffering from worsening depression after the death of her brother.  She reports that she will be going to rehab this upcoming week.  She continues to experience dysphagia, and reports difficulty swallowing as well as food sitting in her epigastric region.  Her previous esophagram in October 2017 demonstrated dysperistalsis of the distal half of the esophagus with delayed emptying.  She was scheduled for an EGD that year at [facility] but it was cancelled due to a loose tooth.  Her previous EGD in 2013 showed residual pills in the distal esophagus with reflux esophagitis on biopsies.\par \par She has recently been having neck pain and has been seeing an ENT physician for carotidynia which has not improved with a steroid course.  An MRI of her neck has been ordered but has not been competed.  She underwent a fiber optic exam, but was advised to follow up with GI for her dysphagia.\par \par She has two other complaints today including dark stools recently and discomfort in her LLQ at the area of her previous sigmoid colon resection for diverticulitis.  She has also been worried about anal cancer as her ex- was recently diagnosed with HPV. [de-identified] : residual pills in esophagus, reflux esophagitis, fundic polyps [de-identified] : tubular adenoma, otherwise normal random biopsies [de-identified] : dysperistalsis of distal half of esophagus

## 2019-03-05 NOTE — ASSESSMENT
[FreeTextEntry1] : 1.  Dysphagia/GERD- upper endoscopy October 21, 2013 revealed residual food in the distal esophagus, reflux esophagitis, fundic polyps and gastritis.  Esophagram in October 2017 with dysperistalsis of distal esophagus. Differential diagnosis includes GERD with esophagitis, eosinophilic esophagitis, motility disorder in setting of autoimmune disease.  Rule out Nelson's esophagus, structural lesions. \par 2.  Intermittent diarrhea and LLQ pain, stable on Imodium.  Likely IBS.  Family history of colon cancer (mother, maternal grandfather).  Colonoscopy in September 8, 2017 with small tubular adenoma, diverticulosis, and normal colonic and ileal mucosal biopsies.\par 3.  History of recurrent diverticulitis status post sigmoid resection (2013).   \par 4.  History of abnormal liver chemistries likely from fatty liver; predisposed because of obesity, alcohol use. \par 5.  Morbid obesity. \par 6.  Alcohol abuse, with frequent relapses. \par 7.  Remote history of Clostridium difficile. \par 8.  Autoimmune disorders- discoid lupus, Sjogren's syndrome, Hashimoto's thyroiditis.\par 9.  Hypertension. \par 10.  Status post appendectomy, cholecystectomy, partial oophorectomy, knee replacements.\par 11.  Lactose intolerance. \par 12.  Fibromyalgia. \par 13.  Multiple drug allergies.  \par \par Plan:\par - Previous results reviewed.\par - Patient was advised to undergo endoscopy in the hospital setting after rehab- procedure, risks, benefits, and alternatives were explained. Patient agreeable. Brochure given.\par - Motility study to be ordered after EGD.\par - The patient was advised on diet and exercise for weight loss.

## 2019-03-05 NOTE — PHYSICAL EXAM
[General Appearance - Alert] : alert [General Appearance - In No Acute Distress] : in no acute distress [General Appearance - Well Nourished] : well nourished [Sclera] : the sclera and conjunctiva were normal [PERRL With Normal Accommodation] : pupils were equal in size, round, and reactive to light [Extraocular Movements] : extraocular movements were intact [Outer Ear] : the ears and nose were normal in appearance [Hearing Threshold Finger Rub Not [place]] : hearing was normal [Oropharynx] : the oropharynx was normal [Neck Appearance] : the appearance of the neck was normal [Neck Cervical Mass (___cm)] : no neck mass was observed [Auscultation Breath Sounds / Voice Sounds] : lungs were clear to auscultation bilaterally [Heart Rate And Rhythm] : heart rate was normal and rhythm regular [Heart Sounds] : normal S1 and S2 [Edema] : there was no peripheral edema [Bowel Sounds] : normal bowel sounds [Abdomen Soft] : soft [] : no hepato-splenomegaly [Abdomen Mass (___ Cm)] : no abdominal mass palpated [Abdomen Hernia] : no hernia was discovered [Normal Sphincter Tone] : normal sphincter tone [No Rectal Mass] : no rectal mass [External Hemorrhoid] : no external hemorrhoids [Occult Blood Positive] : stool was negative for occult blood [Cervical Lymph Nodes Enlarged Anterior Bilaterally] : anterior cervical [Supraclavicular Lymph Nodes Enlarged Bilaterally] : supraclavicular [No CVA Tenderness] : no ~M costovertebral angle tenderness [FreeTextEntry1] : walks with cane [Skin Color & Pigmentation] : normal skin color and pigmentation [No Focal Deficits] : no focal deficits [Oriented To Time, Place, And Person] : oriented to person, place, and time

## 2019-04-01 ENCOUNTER — MOBILE ON CALL (OUTPATIENT)
Age: 69
End: 2019-04-01

## 2019-04-01 ENCOUNTER — OUTPATIENT (OUTPATIENT)
Dept: OUTPATIENT SERVICES | Facility: HOSPITAL | Age: 69
LOS: 1 days | Discharge: HOME | End: 2019-04-01
Payer: MEDICARE

## 2019-04-01 ENCOUNTER — APPOINTMENT (OUTPATIENT)
Dept: GASTROENTEROLOGY | Facility: HOSPITAL | Age: 69
End: 2019-04-01

## 2019-04-01 ENCOUNTER — RESULT REVIEW (OUTPATIENT)
Age: 69
End: 2019-04-01

## 2019-04-01 DIAGNOSIS — O03.9 COMPLETE OR UNSPECIFIED SPONTANEOUS ABORTION WITHOUT COMPLICATION: Chronic | ICD-10-CM

## 2019-04-01 DIAGNOSIS — Z96.652 PRESENCE OF LEFT ARTIFICIAL KNEE JOINT: Chronic | ICD-10-CM

## 2019-04-01 DIAGNOSIS — R13.10 DYSPHAGIA, UNSPECIFIED: ICD-10-CM

## 2019-04-01 PROCEDURE — 43239 EGD BIOPSY SINGLE/MULTIPLE: CPT

## 2019-04-01 PROCEDURE — 88305 TISSUE EXAM BY PATHOLOGIST: CPT | Mod: 26

## 2019-04-02 ENCOUNTER — MEDICATION RENEWAL (OUTPATIENT)
Age: 69
End: 2019-04-02

## 2019-05-23 RX ORDER — PANTOPRAZOLE 40 MG/1
40 TABLET, DELAYED RELEASE ORAL
Qty: 90 | Refills: 3 | Status: ACTIVE | COMMUNITY
Start: 2016-11-08 | End: 1900-01-01

## 2019-06-18 ENCOUNTER — MEDICATION RENEWAL (OUTPATIENT)
Age: 69
End: 2019-06-18

## 2019-07-31 ENCOUNTER — APPOINTMENT (OUTPATIENT)
Dept: ENDOCRINOLOGY | Facility: CLINIC | Age: 69
End: 2019-07-31
Payer: MEDICARE

## 2019-07-31 VITALS
HEART RATE: 75 BPM | SYSTOLIC BLOOD PRESSURE: 108 MMHG | BODY MASS INDEX: 45.15 KG/M2 | HEIGHT: 63.5 IN | DIASTOLIC BLOOD PRESSURE: 68 MMHG | WEIGHT: 258 LBS

## 2019-07-31 PROCEDURE — 99214 OFFICE O/P EST MOD 30 MIN: CPT

## 2019-08-08 NOTE — PHYSICAL EXAM
[Alert] : alert [No Acute Distress] : no acute distress [Well Nourished] : well nourished [Well Developed] : well developed [Normal Sclera/Conjunctiva] : normal sclera/conjunctiva [EOMI] : extra ocular movement intact [No Proptosis] : no proptosis [Normal Oropharynx] : the oropharynx was normal [Thyroid Not Enlarged] : the thyroid was not enlarged [No Thyroid Nodules] : there were no palpable thyroid nodules [No Respiratory Distress] : no respiratory distress [No Accessory Muscle Use] : no accessory muscle use [Clear to Auscultation] : lungs were clear to auscultation bilaterally [Normal Rate] : heart rate was normal  [Regular Rhythm] : with a regular rhythm [Normal S1, S2] : normal S1 and S2 [Pedal Pulses Normal] : the pedal pulses are present [Normal Bowel Sounds] : normal bowel sounds [Not Tender] : non-tender [No Edema] : there was no peripheral edema [Not Distended] : not distended [Soft] : abdomen soft [Post Cervical Nodes] : posterior cervical nodes [No Stigmata of Cushings Syndrome] : no stigmata of cushings syndrome [Normal] : normal and non tender [Anterior Cervical Nodes] : anterior cervical nodes [Normal Strength/Tone] : muscle strength and tone were normal [Normal Gait] : normal gait [No Rash] : no rash [Normal Reflexes] : deep tendon reflexes were 2+ and symmetric [Oriented x3] : oriented to person, place, and time [No Tremors] : no tremors [Acanthosis Nigricans] : no acanthosis nigricans

## 2019-08-08 NOTE — ASSESSMENT
[Importance of Diet and Exercise] : importance of diet and exercise to improve glycemic control, achieve weight loss and improve cardiovascular health [Carbohydrate Consistent Diet] : carbohydrate consistent diet [Levothyroxine] : The patient was instructed to take Levothyroxine on an empty stomach, separate from vitamins, and wait at least 30 minutes before eating [FreeTextEntry1] : prediabetes :\par discussed diet and exercise\par encouraged more exercise walking 30 min 3 x week\par a1c normalized now. She still overeats due to her depression \par \par obesity: multifactorial, etoh abuse, overeating , depression , financial problems. \par cant have bariatric surgery due to esophageal disease .Trying to detox at home under guidance \par \par hypoT: \par pt euthyroid clinically and biochemically.\par cont lT4 137 / 150 mcg qd alternating \par takes LT4 appropriately in am\par  \par HLD: she refuses statins. \par had brain bleeds. seeing neurology

## 2019-08-08 NOTE — HISTORY OF PRESENT ILLNESS
[FreeTextEntry1] : obesity and prediabetes. \par has multiple allergies and has IBS so she can eat a lot of carbs \par has depression, difficult financial situation. \par alcoholism on and off \par hypoT

## 2019-11-25 ENCOUNTER — APPOINTMENT (OUTPATIENT)
Dept: DERMATOLOGY | Facility: CLINIC | Age: 69
End: 2019-11-25
Payer: MEDICARE

## 2019-11-25 PROCEDURE — 99213 OFFICE O/P EST LOW 20 MIN: CPT

## 2020-07-30 ENCOUNTER — APPOINTMENT (OUTPATIENT)
Dept: ENDOCRINOLOGY | Facility: CLINIC | Age: 70
End: 2020-07-30
Payer: MEDICARE

## 2020-07-30 VITALS — TEMPERATURE: 93.4 F

## 2020-07-30 VITALS
DIASTOLIC BLOOD PRESSURE: 72 MMHG | OXYGEN SATURATION: 98 % | WEIGHT: 264 LBS | SYSTOLIC BLOOD PRESSURE: 118 MMHG | BODY MASS INDEX: 46.2 KG/M2 | HEIGHT: 63.5 IN | HEART RATE: 80 BPM | RESPIRATION RATE: 20 BRPM

## 2020-07-30 LAB
T3 SERPL-MCNC: 92 NG/DL
T4 FREE SERPL-MCNC: 1.8 NG/DL
TSH SERPL-ACNC: 4.37 UIU/ML

## 2020-07-30 PROCEDURE — 99214 OFFICE O/P EST MOD 30 MIN: CPT

## 2020-07-30 NOTE — PHYSICAL EXAM
[Alert] : alert [Well Nourished] : well nourished [No Acute Distress] : no acute distress [Well Developed] : well developed [EOMI] : extra ocular movement intact [No Proptosis] : no proptosis [Normal Sclera/Conjunctiva] : normal sclera/conjunctiva [Normal Oropharynx] : the oropharynx was normal [No Thyroid Nodules] : no palpable thyroid nodules [Thyroid Not Enlarged] : the thyroid was not enlarged [No Accessory Muscle Use] : no accessory muscle use [No Respiratory Distress] : no respiratory distress [Clear to Auscultation] : lungs were clear to auscultation bilaterally [Normal S1, S2] : normal S1 and S2 [Normal Rate] : heart rate was normal [No Edema] : no peripheral edema [Regular Rhythm] : with a regular rhythm [Pedal Pulses Normal] : the pedal pulses are present [Normal Bowel Sounds] : normal bowel sounds [Not Tender] : non-tender [Not Distended] : not distended [Normal Anterior Cervical Nodes] : no anterior cervical lymphadenopathy [Soft] : abdomen soft [Spine Straight] : spine straight [Normal Posterior Cervical Nodes] : no posterior cervical lymphadenopathy [Normal Gait] : normal gait [No Rash] : no rash [No Tremors] : no tremors [Oriented x3] : oriented to person, place, and time [Acanthosis Nigricans] : no acanthosis nigricans

## 2020-07-30 NOTE — ASSESSMENT
[Importance of Diet and Exercise] : importance of diet and exercise to improve glycemic control, achieve weight loss and improve cardiovascular health [Exercise/Effect on Glucose] : exercise/effect on glucose [Weight Loss] : weight loss [Levothyroxine] : The patient was instructed to take Levothyroxine on an empty stomach, separate from vitamins, and wait at least 30 minutes before eating [FreeTextEntry1] : prediabetes : A1c now normal. Discussed diet and exercise and strongly encouraged to start walking 3x week at least. Overeating due to her depression and  financial problems and stress . She started drinkign again. Encouraged to join again AA.\par \par Obesity: multifactorial, etoh abuse, overeating , depression , financial problems. \par cant have bariatric surgery due to esophageal disease . She gained 8 lbs. \par Discussed exercise and she does not get out of the house. Encouraged to start doing a  little walking. \par \par hypoT: \par check labs , tfts today \par cont lT4 137 / 150 mcg qd alternating \par Take daily in AM on an empty stomach at least 30 minutes before eating or taking other medication.\par will call pt with results. \par \par HLD: she refuses statins. She had a brain hemorrhage but she never followed with neurology. Strongly encouraged to followup with him.

## 2021-01-21 ENCOUNTER — APPOINTMENT (OUTPATIENT)
Dept: ENDOCRINOLOGY | Facility: CLINIC | Age: 71
End: 2021-01-21
Payer: MEDICARE

## 2021-01-21 DIAGNOSIS — E78.00 PURE HYPERCHOLESTEROLEMIA, UNSPECIFIED: ICD-10-CM

## 2021-01-21 DIAGNOSIS — K75.81 NONALCOHOLIC STEATOHEPATITIS (NASH): ICD-10-CM

## 2021-01-21 PROCEDURE — 99214 OFFICE O/P EST MOD 30 MIN: CPT | Mod: 95

## 2021-01-21 NOTE — ASSESSMENT
[Carbohydrate Consistent Diet] : carbohydrate consistent diet [Exercise/Effect on Glucose] : exercise/effect on glucose [Weight Loss] : weight loss [Levothyroxine] : The patient was instructed to take Levothyroxine on an empty stomach, separate from vitamins, and wait at least 30 minutes before eating [FreeTextEntry1] : Prediabetes, HLD< obesity, hypoT in patient with severe depression and severe ETOH abuse for many years. \par \par Prediabetes : \par A1c normalized. \par encouraged to start walking 3x week at least.\par advsied not to eat carbs but that's al; she does \par she still drinks \par strongly encouraged to join again AA.\par \par Obesity: weight stable\par multifactorial, etoh abuse, overeating , depression.\par cant have bariatric surgery due to esophageal disease .\par Encouraged to start doing a  little walking. \par \par hypoT: \par TSh slightly above target. \par will increase LT4 150 mcg qd \par Take daily in AM on an empty stomach at least 30 minutes before eating or taking other medication.\par \par HLD: she refuses statins and has COATS due to alcohol abuse. \par She had an eye  hemorrhage and continue to followup with ophthalmology \par  and .\par \par This was a telehealth service rendered via interactive audio and video telecommunication\par  system.\par Verbal consent obtained from patient for telemedicine encounter during COVID crisis.\par Discussed with patient given unable to provide physical exam, evaluation done on symptoms only. \par \par

## 2021-03-29 ENCOUNTER — APPOINTMENT (OUTPATIENT)
Dept: DERMATOLOGY | Facility: CLINIC | Age: 71
End: 2021-03-29

## 2021-10-01 ENCOUNTER — RX RENEWAL (OUTPATIENT)
Age: 71
End: 2021-10-01

## 2021-10-11 ENCOUNTER — APPOINTMENT (OUTPATIENT)
Dept: ENDOCRINOLOGY | Facility: CLINIC | Age: 71
End: 2021-10-11
Payer: MEDICARE

## 2021-10-11 VITALS — SYSTOLIC BLOOD PRESSURE: 112 MMHG | HEART RATE: 89 BPM | OXYGEN SATURATION: 97 % | DIASTOLIC BLOOD PRESSURE: 72 MMHG

## 2021-10-11 VITALS — HEIGHT: 63 IN | WEIGHT: 267 LBS | BODY MASS INDEX: 47.31 KG/M2

## 2021-10-11 DIAGNOSIS — E03.9 HYPOTHYROIDISM, UNSPECIFIED: ICD-10-CM

## 2021-10-11 DIAGNOSIS — I10 ESSENTIAL (PRIMARY) HYPERTENSION: ICD-10-CM

## 2021-10-11 DIAGNOSIS — R73.03 PREDIABETES.: ICD-10-CM

## 2021-10-11 DIAGNOSIS — E66.01 MORBID (SEVERE) OBESITY DUE TO EXCESS CALORIES: ICD-10-CM

## 2021-10-11 DIAGNOSIS — E05.10 THYROTOXICOSIS WITH TOXIC SINGLE THYROID NODULE W/OUT THYROTOXIC CRISIS OR STORM: ICD-10-CM

## 2021-10-11 PROCEDURE — 99214 OFFICE O/P EST MOD 30 MIN: CPT

## 2021-10-11 RX ORDER — LEVOTHYROXINE SODIUM 0.14 MG/1
137 TABLET ORAL
Qty: 45 | Refills: 3 | Status: DISCONTINUED | COMMUNITY
Start: 2016-05-23 | End: 2021-10-11

## 2021-10-11 RX ORDER — LEVOTHYROXINE SODIUM 0.15 MG/1
150 TABLET ORAL
Qty: 90 | Refills: 0 | Status: ACTIVE | COMMUNITY
Start: 2017-02-16 | End: 1900-01-01

## 2021-10-11 NOTE — PHYSICAL EXAM
[Alert] : alert [Well Nourished] : well nourished [Obese] : obese [No Acute Distress] : no acute distress [Well Developed] : well developed [Normal Sclera/Conjunctiva] : normal sclera/conjunctiva [EOMI] : extra ocular movement intact [No Proptosis] : no proptosis [Normal Oropharynx] : the oropharynx was normal [Thyroid Not Enlarged] : the thyroid was not enlarged [No Thyroid Nodules] : no palpable thyroid nodules [No Respiratory Distress] : no respiratory distress [No Accessory Muscle Use] : no accessory muscle use [Clear to Auscultation] : lungs were clear to auscultation bilaterally [Normal S1, S2] : normal S1 and S2 [Normal Rate] : heart rate was normal [Regular Rhythm] : with a regular rhythm [No Edema] : no peripheral edema [Pedal Pulses Normal] : the pedal pulses are present [Normal Bowel Sounds] : normal bowel sounds [Not Tender] : non-tender [Not Distended] : not distended [Soft] : abdomen soft [Normal Anterior Cervical Nodes] : no anterior cervical lymphadenopathy [Spine Straight] : spine straight [Normal Gait] : normal gait [No Rash] : no rash [No Tremors] : no tremors [Oriented x3] : oriented to person, place, and time [Acanthosis Nigricans] : no acanthosis nigricans

## 2021-10-11 NOTE — HISTORY OF PRESENT ILLNESS
[FreeTextEntry1] : obesity and prediabetes. \par has multiple allergies and has IBS so she can eat a lot of carbs \par has depression\par alcoholism on and off \par

## 2021-10-11 NOTE — ASSESSMENT
[Carbohydrate Consistent Diet] : carbohydrate consistent diet [Exercise/Effect on Glucose] : exercise/effect on glucose [FreeTextEntry1] : Prediabetes, HLD, obesity, hypoT in patient with severe depression and severe ETOH abuse for many years. \par Moving to [place] in a month. \par \par Prediabetes :  A1c 5.9 \par encouraged to start walking 3x week at least.\par she will need detox when [place] \par \par Obesity: continuing  to gain weight. \par multifactorial, etoh abuse, overeating , depression.\par cant have bariatric surgery due to esophageal disease .\par \par hypoT: pt euthyroid clinically and biochemically.\par cont  LT4 150 mcg qd \par Take daily in AM on an empty stomach at least 30 minutes before eating or taking other medication.\par all lab results reviewed and discussed with patient. All questions answered\par \par HLD: high TC and LDL. \par  she refuses statins and has COATS due to alcohol abuse. \par \par Alcoholic hepatitis ; continue to monitor LFts. \par \par

## 2021-11-04 ENCOUNTER — NON-APPOINTMENT (OUTPATIENT)
Age: 71
End: 2021-11-04

## 2021-11-04 DIAGNOSIS — R59.0 LOCALIZED ENLARGED LYMPH NODES: ICD-10-CM

## 2022-04-22 ENCOUNTER — RX RENEWAL (OUTPATIENT)
Age: 72
End: 2022-04-22

## 2023-06-16 NOTE — H&P PST ADULT - GASTROINTESTINAL
Pt presents to clinic today for A POSSIBLE EAR INFECTION, face ws a little red today after the park and when checking her temp it was 103.5 earlier, was given motrin today about an hour ago..          Medication Reconciliation: Tomy Gómez LPN,LPN on 6/16/2023 at 2:47 PM    detailed exam <-- Click to add NO significant Past Surgical History details…

## 2023-09-15 ASSESSMENT — KOOS JR
HOW SEVERE IS YOUR KNEE STIFFNESS AFTER FIRST WAKING IN MORNING: MILD
IMPORTED FORM: YES
STANDING UPRIGHT: MODERATE
BENDING TO THE FLOOR TO PICK UP OBJECT: MILD
TWISING OR PIVOTING ON KNEE: MILD
HOW SEVERE IS YOUR KNEE STIFFNESS AFTER FIRST WAKING IN MORNING: MODERATE
STANDING UPRIGHT: MILD
TWISING OR PIVOTING ON KNEE: SEVERE
RISING FROM SITTING: MODERATE
KOOS JR RAW SCORE: 9
IMPORTED KOOS JR SCORE: 54.84
KOOS JR RAW SCORE: 13
IMPORTED KOOS JR SCORE: 63.78
STRAIGHTENING KNEE FULLY: MILD
IMPORTED LATERALITY: RIGHT
GOING UP OR DOWN STAIRS: MODERATE

## 2023-10-07 NOTE — ED ADULT NURSE NOTE - DURATION
Provider in room to perform vaginal exam assisted by nurse     Chiquita Malloy RN  10/06/23 2075 today

## 2024-02-12 NOTE — ED PROVIDER NOTE - DURATION
Assessment/Plan:         Diagnoses and all orders for this visit:    Acute pharyngitis, unspecified etiology  -     POCT rapid ANTIGEN strepA  -     Throat culture; Future  -     Throat culture          Strep screen is NEGATIVE, culture is pending.  Viral infection suspected. Seasonal allergies also possible.  Treat with antibiotic for clinically significant positive culture. Supportive care and follow up instructions reviewed.  Use nasal saline and humidified air as needed.  Encourage hydration. Tylenol or motrin prn.  Recheck for fever, increasing or persisting symptoms prn.    Subjective:      Patient ID: Vianney Robert is a 13 y.o. female.    Sore Throat  This is a new problem. The current episode started yesterday. The problem has been unchanged. Associated symptoms include a sore throat. Pertinent negatives include no abdominal pain, change in bowel habit, chills, congestion, coughing, fever, myalgias, nausea, rash, swollen glands or vomiting. The symptoms are aggravated by drinking and eating. Treatments tried: mucinex. The treatment provided no relief.       The following portions of the patient's history were reviewed and updated as appropriate: allergies, current medications, past family history, past medical history, past social history, past surgical history, and problem list.    Review of Systems   Constitutional:  Negative for chills and fever.   HENT:  Positive for sore throat. Negative for congestion.    Respiratory:  Negative for cough.    Gastrointestinal:  Negative for abdominal pain, change in bowel habit, nausea and vomiting.   Musculoskeletal:  Negative for myalgias.   Skin:  Negative for rash.         Objective:      BP (!) 122/65   Pulse 71   Temp 98.4 °F (36.9 °C)   Resp 14   Wt 56.2 kg (124 lb)   SpO2 99%          Physical Exam  Vitals and nursing note reviewed.   Constitutional:       General: She is not in acute distress.     Appearance: She is well-developed.   HENT:      Head:  Normocephalic and atraumatic.      Right Ear: Tympanic membrane normal.      Left Ear: Tympanic membrane normal.      Nose: Nose normal.      Mouth/Throat:      Mouth: Mucous membranes are moist. No oral lesions.      Pharynx: Oropharynx is clear. Uvula midline. No pharyngeal swelling, oropharyngeal exudate, posterior oropharyngeal erythema or uvula swelling.      Tonsils: No tonsillar exudate. 0 on the right. 0 on the left.   Eyes:      Extraocular Movements: Extraocular movements intact.      Conjunctiva/sclera: Conjunctivae normal.      Pupils: Pupils are equal, round, and reactive to light.   Neck:      Thyroid: No thyromegaly.   Cardiovascular:      Rate and Rhythm: Normal rate and regular rhythm.      Pulses: Normal pulses.      Heart sounds: Normal heart sounds. No murmur heard.  Pulmonary:      Effort: Pulmonary effort is normal.      Breath sounds: Normal breath sounds.   Abdominal:      General: Bowel sounds are normal.      Palpations: Abdomen is soft.   Musculoskeletal:         General: No deformity. Normal range of motion.      Cervical back: Normal range of motion and neck supple.   Lymphadenopathy:      Cervical: No cervical adenopathy.   Skin:     General: Skin is warm.      Capillary Refill: Capillary refill takes less than 2 seconds.      Findings: No rash.   Neurological:      General: No focal deficit present.      Mental Status: She is alert and oriented to person, place, and time.   Psychiatric:         Mood and Affect: Mood normal.         Behavior: Behavior normal.            today

## 2025-07-18 NOTE — REVIEW OF SYSTEMS
2.06 [Feeling Poorly] : feeling poorly [Feeling Tired] : feeling tired [Dry Eyes] : dryness of the eyes [Hoarseness] : hoarseness [Orthopnea] : orthopnea [Abdominal Pain] : abdominal pain [Arthralgias] : arthralgias [Anxiety] : anxiety [Depression] : depression [Swollen Glands In The Neck] : swollen glands in the neck [Negative] : Heme/Lymph [FreeTextEntry2] : pt has auto immune disorder [FreeTextEntry5] : high blood pressure [de-identified] : chronic uticaria [de-identified] : partial bleed in brain, balance problems [de-identified] : hypothyroid [de-identified] : sjogren syndrome